# Patient Record
Sex: FEMALE | Race: WHITE | NOT HISPANIC OR LATINO
[De-identification: names, ages, dates, MRNs, and addresses within clinical notes are randomized per-mention and may not be internally consistent; named-entity substitution may affect disease eponyms.]

---

## 2017-09-25 ENCOUNTER — APPOINTMENT (OUTPATIENT)
Dept: MRI IMAGING | Facility: CLINIC | Age: 57
End: 2017-09-25

## 2017-10-22 ENCOUNTER — RESULT REVIEW (OUTPATIENT)
Age: 57
End: 2017-10-22

## 2017-11-22 ENCOUNTER — APPOINTMENT (OUTPATIENT)
Dept: MRI IMAGING | Facility: CLINIC | Age: 57
End: 2017-11-22
Payer: COMMERCIAL

## 2017-11-22 ENCOUNTER — OUTPATIENT (OUTPATIENT)
Dept: OUTPATIENT SERVICES | Facility: HOSPITAL | Age: 57
LOS: 1 days | End: 2017-11-22
Payer: COMMERCIAL

## 2017-11-22 DIAGNOSIS — Z00.8 ENCOUNTER FOR OTHER GENERAL EXAMINATION: ICD-10-CM

## 2017-11-22 PROCEDURE — 72141 MRI NECK SPINE W/O DYE: CPT

## 2017-11-22 PROCEDURE — 72141 MRI NECK SPINE W/O DYE: CPT | Mod: 26

## 2018-03-06 ENCOUNTER — APPOINTMENT (OUTPATIENT)
Dept: OBGYN | Facility: CLINIC | Age: 58
End: 2018-03-06
Payer: COMMERCIAL

## 2018-03-06 VITALS
BODY MASS INDEX: 21.38 KG/M2 | HEIGHT: 66 IN | DIASTOLIC BLOOD PRESSURE: 62 MMHG | SYSTOLIC BLOOD PRESSURE: 126 MMHG | WEIGHT: 133 LBS

## 2018-03-06 PROCEDURE — 99396 PREV VISIT EST AGE 40-64: CPT

## 2018-03-12 LAB — CYTOLOGY CVX/VAG DOC THIN PREP: NORMAL

## 2018-04-10 ENCOUNTER — ASOB RESULT (OUTPATIENT)
Age: 58
End: 2018-04-10

## 2018-04-10 ENCOUNTER — APPOINTMENT (OUTPATIENT)
Dept: OBGYN | Facility: CLINIC | Age: 58
End: 2018-04-10
Payer: COMMERCIAL

## 2018-04-10 PROCEDURE — 76830 TRANSVAGINAL US NON-OB: CPT

## 2018-04-25 DIAGNOSIS — K63.5 POLYP OF COLON: ICD-10-CM

## 2018-04-30 ENCOUNTER — OUTPATIENT (OUTPATIENT)
Dept: OUTPATIENT SERVICES | Facility: HOSPITAL | Age: 58
LOS: 1 days | End: 2018-04-30
Payer: COMMERCIAL

## 2018-04-30 ENCOUNTER — APPOINTMENT (OUTPATIENT)
Dept: SURGERY | Facility: HOSPITAL | Age: 58
End: 2018-04-30
Payer: COMMERCIAL

## 2018-04-30 DIAGNOSIS — K63.5 POLYP OF COLON: ICD-10-CM

## 2018-04-30 PROCEDURE — 45378 DIAGNOSTIC COLONOSCOPY: CPT

## 2018-04-30 PROCEDURE — G0105: CPT

## 2019-05-08 ENCOUNTER — APPOINTMENT (OUTPATIENT)
Dept: OBGYN | Facility: CLINIC | Age: 59
End: 2019-05-08
Payer: COMMERCIAL

## 2019-05-08 VITALS
SYSTOLIC BLOOD PRESSURE: 120 MMHG | DIASTOLIC BLOOD PRESSURE: 70 MMHG | HEIGHT: 66 IN | WEIGHT: 137 LBS | BODY MASS INDEX: 22.02 KG/M2

## 2019-05-08 DIAGNOSIS — Z87.898 PERSONAL HISTORY OF OTHER SPECIFIED CONDITIONS: ICD-10-CM

## 2019-05-08 DIAGNOSIS — B05.9 MEASLES W/OUT COMPLICATION: ICD-10-CM

## 2019-05-08 PROCEDURE — 36415 COLL VENOUS BLD VENIPUNCTURE: CPT

## 2019-05-08 PROCEDURE — 99396 PREV VISIT EST AGE 40-64: CPT

## 2019-05-09 PROBLEM — Z87.898 HISTORY OF VERTIGO: Status: RESOLVED | Noted: 2019-05-09 | Resolved: 2019-05-09

## 2019-05-09 NOTE — PHYSICAL EXAM
[Awake] : awake [Acute Distress] : no acute distress [Alert] : alert [Mass] : no breast mass [Nipple Discharge] : no nipple discharge [Soft] : soft [Axillary LAD] : no axillary lymphadenopathy [Tender] : non tender [Oriented x3] : oriented to person, place, and time [Normal] : uterus [Uterine Adnexae] : were not tender and not enlarged

## 2019-05-10 LAB
MEV IGG FLD QL IA: 36.3 AU/ML
MEV IGG+IGM SER-IMP: POSITIVE

## 2019-05-13 LAB — CYTOLOGY CVX/VAG DOC THIN PREP: NORMAL

## 2019-09-09 ENCOUNTER — APPOINTMENT (OUTPATIENT)
Dept: OBGYN | Facility: CLINIC | Age: 59
End: 2019-09-09
Payer: COMMERCIAL

## 2019-09-09 PROCEDURE — XXXXX: CPT

## 2019-09-11 ENCOUNTER — APPOINTMENT (OUTPATIENT)
Dept: RADIOLOGY | Facility: IMAGING CENTER | Age: 59
End: 2019-09-11

## 2019-10-07 ENCOUNTER — OUTPATIENT (OUTPATIENT)
Dept: OUTPATIENT SERVICES | Facility: HOSPITAL | Age: 59
LOS: 1 days | End: 2019-10-07
Payer: COMMERCIAL

## 2019-10-07 ENCOUNTER — APPOINTMENT (OUTPATIENT)
Dept: RADIOLOGY | Facility: IMAGING CENTER | Age: 59
End: 2019-10-07
Payer: COMMERCIAL

## 2019-10-07 DIAGNOSIS — Z00.8 ENCOUNTER FOR OTHER GENERAL EXAMINATION: ICD-10-CM

## 2019-10-07 PROCEDURE — 77080 DXA BONE DENSITY AXIAL: CPT

## 2019-10-07 PROCEDURE — 77080 DXA BONE DENSITY AXIAL: CPT | Mod: 26

## 2020-10-19 ENCOUNTER — ASOB RESULT (OUTPATIENT)
Age: 60
End: 2020-10-19

## 2020-10-19 ENCOUNTER — APPOINTMENT (OUTPATIENT)
Dept: OBGYN | Facility: CLINIC | Age: 60
End: 2020-10-19
Payer: COMMERCIAL

## 2020-10-19 PROCEDURE — 76830 TRANSVAGINAL US NON-OB: CPT

## 2021-08-09 ENCOUNTER — APPOINTMENT (OUTPATIENT)
Dept: OBGYN | Facility: CLINIC | Age: 61
End: 2021-08-09
Payer: COMMERCIAL

## 2021-08-09 VITALS
DIASTOLIC BLOOD PRESSURE: 80 MMHG | HEIGHT: 66 IN | BODY MASS INDEX: 21.53 KG/M2 | WEIGHT: 134 LBS | SYSTOLIC BLOOD PRESSURE: 120 MMHG

## 2021-08-09 DIAGNOSIS — N83.299 OTHER OVARIAN CYST, UNSPECIFIED SIDE: ICD-10-CM

## 2021-08-09 DIAGNOSIS — N83.202 UNSPECIFIED OVARIAN CYST, LEFT SIDE: ICD-10-CM

## 2021-08-09 DIAGNOSIS — Z01.419 ENCOUNTER FOR GYNECOLOGICAL EXAMINATION (GENERAL) (ROUTINE) W/OUT ABNORMAL FINDINGS: ICD-10-CM

## 2021-08-09 PROCEDURE — 99396 PREV VISIT EST AGE 40-64: CPT

## 2021-08-13 LAB — CYTOLOGY CVX/VAG DOC THIN PREP: NORMAL

## 2022-05-25 DIAGNOSIS — Z86.010 PERSONAL HISTORY OF COLONIC POLYPS: ICD-10-CM

## 2022-07-01 DIAGNOSIS — Z00.00 ENCOUNTER FOR GENERAL ADULT MEDICAL EXAMINATION W/OUT ABNORMAL FINDINGS: ICD-10-CM

## 2022-07-01 DIAGNOSIS — Z12.11 ENCOUNTER FOR SCREENING FOR MALIGNANT NEOPLASM OF COLON: ICD-10-CM

## 2022-07-15 LAB — SARS-COV-2 N GENE NPH QL NAA+PROBE: NOT DETECTED

## 2022-07-19 ENCOUNTER — APPOINTMENT (OUTPATIENT)
Dept: SURGERY | Facility: CLINIC | Age: 62
End: 2022-07-19

## 2022-07-19 PROCEDURE — 45380 COLONOSCOPY AND BIOPSY: CPT

## 2022-07-19 PROCEDURE — 45381 COLONOSCOPY SUBMUCOUS NJX: CPT

## 2022-07-22 LAB — CORE LAB BIOPSY: NORMAL

## 2022-07-29 ENCOUNTER — NON-APPOINTMENT (OUTPATIENT)
Age: 62
End: 2022-07-29

## 2022-07-29 DIAGNOSIS — Z01.812 ENCOUNTER FOR PREPROCEDURAL LABORATORY EXAMINATION: ICD-10-CM

## 2022-10-03 ENCOUNTER — RESULT REVIEW (OUTPATIENT)
Age: 62
End: 2022-10-03

## 2022-10-03 ENCOUNTER — APPOINTMENT (OUTPATIENT)
Dept: GASTROENTEROLOGY | Facility: HOSPITAL | Age: 62
End: 2022-10-03

## 2022-10-03 ENCOUNTER — OUTPATIENT (OUTPATIENT)
Dept: OUTPATIENT SERVICES | Facility: HOSPITAL | Age: 62
LOS: 1 days | Discharge: HOME | End: 2022-10-03

## 2022-10-03 VITALS
WEIGHT: 134.92 LBS | HEIGHT: 65 IN | DIASTOLIC BLOOD PRESSURE: 88 MMHG | TEMPERATURE: 99 F | HEART RATE: 82 BPM | RESPIRATION RATE: 19 BRPM | OXYGEN SATURATION: 97 % | SYSTOLIC BLOOD PRESSURE: 146 MMHG

## 2022-10-03 VITALS
DIASTOLIC BLOOD PRESSURE: 80 MMHG | OXYGEN SATURATION: 100 % | SYSTOLIC BLOOD PRESSURE: 140 MMHG | HEART RATE: 79 BPM | RESPIRATION RATE: 12 BRPM

## 2022-10-03 DIAGNOSIS — K63.5 POLYP OF COLON: ICD-10-CM

## 2022-10-03 LAB — SARS-COV-2 N GENE NPH QL NAA+PROBE: NOT DETECTED

## 2022-10-03 PROCEDURE — 45388 COLONOSCOPY W/ABLATION: CPT | Mod: 59

## 2022-10-03 PROCEDURE — 88305 TISSUE EXAM BY PATHOLOGIST: CPT | Mod: 26

## 2022-10-03 PROCEDURE — 45390 COLONOSCOPY W/RESECTION: CPT

## 2022-10-03 DEVICE — CLIP HEMO INSTINCT PLUS ENDOSCOPIC: Type: IMPLANTABLE DEVICE | Status: FUNCTIONAL

## 2022-10-03 DEVICE — CLIP RESOLUTION 360 235CM: Type: IMPLANTABLE DEVICE | Status: FUNCTIONAL

## 2022-10-10 LAB — SURGICAL PATHOLOGY STUDY: SIGNIFICANT CHANGE UP

## 2022-10-13 ENCOUNTER — APPOINTMENT (OUTPATIENT)
Dept: SURGERY | Facility: CLINIC | Age: 62
End: 2022-10-13

## 2022-10-13 VITALS
HEIGHT: 66 IN | SYSTOLIC BLOOD PRESSURE: 129 MMHG | DIASTOLIC BLOOD PRESSURE: 87 MMHG | OXYGEN SATURATION: 98 % | BODY MASS INDEX: 21.69 KG/M2 | TEMPERATURE: 97.2 F | WEIGHT: 135 LBS | HEART RATE: 96 BPM | RESPIRATION RATE: 17 BRPM

## 2022-10-13 DIAGNOSIS — Z80.42 FAMILY HISTORY OF MALIGNANT NEOPLASM OF PROSTATE: ICD-10-CM

## 2022-10-13 DIAGNOSIS — Z80.0 FAMILY HISTORY OF MALIGNANT NEOPLASM OF DIGESTIVE ORGANS: ICD-10-CM

## 2022-10-13 PROCEDURE — 99215 OFFICE O/P EST HI 40 MIN: CPT

## 2022-10-13 RX ORDER — ASPIRIN 81 MG
81 TABLET,CHEWABLE ORAL
Refills: 0 | Status: DISCONTINUED | COMMUNITY
End: 2022-10-13

## 2022-10-13 RX ORDER — SODIUM PICOSULFATE, MAGNESIUM OXIDE, AND ANHYDROUS CITRIC ACID 10; 3.5; 12 MG/160ML; G/160ML; G/160ML
10-3.5-12 MG-GM LIQUID ORAL
Qty: 1 | Refills: 0 | Status: DISCONTINUED | COMMUNITY
Start: 2022-07-01 | End: 2022-10-13

## 2022-10-13 NOTE — ASSESSMENT
[FreeTextEntry1] : Recurrent lesion in cecum at prior polypectomy site.  This occurred approximately 9 years after polypectomy.  ESD performed and adenocarcinoma less than 1/2 mm from the cauterized margin.  Therefore, resection indicated.  Indications, risks, benefits, alternatives reviewed for laparoscopic right colectomy including but not limited to bleeding, infection, anastomotic leak, conversion to open surgery and change in bowel habits.  CT scans of chest abdomen pelvis prior to surgery.  Patient's  was present for the conversation and all questions were answered.

## 2022-10-13 NOTE — PHYSICAL EXAM
[Normal Breath Sounds] : Normal breath sounds [Normal Heart Sounds] : normal heart sounds [Alert] : alert [Oriented to Person] : oriented to person [Oriented to Place] : oriented to place [Oriented to Time] : oriented to time [Calm] : calm [Abdomen Masses] : No abdominal masses [Abdomen Tenderness] : ~T No ~M abdominal tenderness [de-identified] : WNL [de-identified] : WNL [de-identified] : BASIAL [de-identified] : WNL ROM [de-identified] : WNL

## 2022-10-13 NOTE — HISTORY OF PRESENT ILLNESS
[FreeTextEntry1] : Dione is a 61 y/o female here for a follow up visit, colon cancer. \par \par Colonoscopy on 10/3/22 - one approximately 22mm flat sessile polyp was found in cecum. Endoscopic mucosal resection utilizing a Hybrid EMR/ESD technique was performed. Resection and retrieval were complete. 2 x Hemostatic clips were placed post polypectomy for hemostasis (MR conditional). Pathology - 1. Colon, cecum, polyp: invasive well differentiated adenocarcinoma arising in the tubular adenoma with focal high grade dysplasia. Carcinoma invades the submucosal and is present less than 0.5mm from the nearest deep cauterized tissue edge. Negative for lymphovascular invasion. \par \par Today pt reports no pain. Daily BMs, loose for past 2 days, no straining, no bleeding, no episodes of incontinence, and denies feeling swollen or prolapsed tissue. Denies nausea and vomiting. Denies fever and chills. Good appetite. Takes baby aspirin for prevention.

## 2022-10-17 ENCOUNTER — RESULT REVIEW (OUTPATIENT)
Age: 62
End: 2022-10-17

## 2022-10-17 ENCOUNTER — OUTPATIENT (OUTPATIENT)
Dept: OUTPATIENT SERVICES | Facility: HOSPITAL | Age: 62
LOS: 1 days | End: 2022-10-17
Payer: COMMERCIAL

## 2022-10-17 ENCOUNTER — APPOINTMENT (OUTPATIENT)
Dept: CT IMAGING | Facility: CLINIC | Age: 62
End: 2022-10-17

## 2022-10-17 DIAGNOSIS — Z00.8 ENCOUNTER FOR OTHER GENERAL EXAMINATION: ICD-10-CM

## 2022-10-17 PROCEDURE — 74177 CT ABD & PELVIS W/CONTRAST: CPT

## 2022-10-17 PROCEDURE — 71260 CT THORAX DX C+: CPT

## 2022-10-18 ENCOUNTER — OUTPATIENT (OUTPATIENT)
Dept: OUTPATIENT SERVICES | Facility: HOSPITAL | Age: 62
LOS: 1 days | End: 2022-10-18
Payer: COMMERCIAL

## 2022-10-18 VITALS
DIASTOLIC BLOOD PRESSURE: 70 MMHG | TEMPERATURE: 98 F | SYSTOLIC BLOOD PRESSURE: 110 MMHG | RESPIRATION RATE: 16 BRPM | WEIGHT: 141.1 LBS | HEART RATE: 70 BPM | HEIGHT: 66 IN | OXYGEN SATURATION: 99 %

## 2022-10-18 DIAGNOSIS — Z90.89 ACQUIRED ABSENCE OF OTHER ORGANS: Chronic | ICD-10-CM

## 2022-10-18 DIAGNOSIS — Z01.818 ENCOUNTER FOR OTHER PREPROCEDURAL EXAMINATION: ICD-10-CM

## 2022-10-18 DIAGNOSIS — Z98.891 HISTORY OF UTERINE SCAR FROM PREVIOUS SURGERY: Chronic | ICD-10-CM

## 2022-10-18 DIAGNOSIS — Z29.9 ENCOUNTER FOR PROPHYLACTIC MEASURES, UNSPECIFIED: ICD-10-CM

## 2022-10-18 DIAGNOSIS — Z11.52 ENCOUNTER FOR SCREENING FOR COVID-19: ICD-10-CM

## 2022-10-18 DIAGNOSIS — C18.9 MALIGNANT NEOPLASM OF COLON, UNSPECIFIED: ICD-10-CM

## 2022-10-18 DIAGNOSIS — Z98.890 OTHER SPECIFIED POSTPROCEDURAL STATES: Chronic | ICD-10-CM

## 2022-10-18 LAB
ANION GAP SERPL CALC-SCNC: 13 MMOL/L — SIGNIFICANT CHANGE UP (ref 5–17)
BLD GP AB SCN SERPL QL: NEGATIVE — SIGNIFICANT CHANGE UP
BUN SERPL-MCNC: 21 MG/DL — SIGNIFICANT CHANGE UP (ref 7–23)
CALCIUM SERPL-MCNC: 9.7 MG/DL — SIGNIFICANT CHANGE UP (ref 8.4–10.5)
CHLORIDE SERPL-SCNC: 103 MMOL/L — SIGNIFICANT CHANGE UP (ref 96–108)
CO2 SERPL-SCNC: 24 MMOL/L — SIGNIFICANT CHANGE UP (ref 22–31)
CREAT SERPL-MCNC: 0.83 MG/DL — SIGNIFICANT CHANGE UP (ref 0.5–1.3)
EGFR: 80 ML/MIN/1.73M2 — SIGNIFICANT CHANGE UP
GLUCOSE SERPL-MCNC: 87 MG/DL — SIGNIFICANT CHANGE UP (ref 70–99)
HCT VFR BLD CALC: 38.1 % — SIGNIFICANT CHANGE UP (ref 34.5–45)
HGB BLD-MCNC: 12.7 G/DL — SIGNIFICANT CHANGE UP (ref 11.5–15.5)
MCHC RBC-ENTMCNC: 30.1 PG — SIGNIFICANT CHANGE UP (ref 27–34)
MCHC RBC-ENTMCNC: 33.3 GM/DL — SIGNIFICANT CHANGE UP (ref 32–36)
MCV RBC AUTO: 90.3 FL — SIGNIFICANT CHANGE UP (ref 80–100)
NRBC # BLD: 0 /100 WBCS — SIGNIFICANT CHANGE UP (ref 0–0)
PLATELET # BLD AUTO: 268 K/UL — SIGNIFICANT CHANGE UP (ref 150–400)
POTASSIUM SERPL-MCNC: 3.9 MMOL/L — SIGNIFICANT CHANGE UP (ref 3.5–5.3)
POTASSIUM SERPL-SCNC: 3.9 MMOL/L — SIGNIFICANT CHANGE UP (ref 3.5–5.3)
RBC # BLD: 4.22 M/UL — SIGNIFICANT CHANGE UP (ref 3.8–5.2)
RBC # FLD: 13 % — SIGNIFICANT CHANGE UP (ref 10.3–14.5)
RH IG SCN BLD-IMP: POSITIVE — SIGNIFICANT CHANGE UP
SARS-COV-2 RNA SPEC QL NAA+PROBE: SIGNIFICANT CHANGE UP
SODIUM SERPL-SCNC: 140 MMOL/L — SIGNIFICANT CHANGE UP (ref 135–145)
WBC # BLD: 8.14 K/UL — SIGNIFICANT CHANGE UP (ref 3.8–10.5)
WBC # FLD AUTO: 8.14 K/UL — SIGNIFICANT CHANGE UP (ref 3.8–10.5)

## 2022-10-18 PROCEDURE — 83036 HEMOGLOBIN GLYCOSYLATED A1C: CPT

## 2022-10-18 PROCEDURE — U0005: CPT

## 2022-10-18 PROCEDURE — 86900 BLOOD TYPING SEROLOGIC ABO: CPT

## 2022-10-18 PROCEDURE — 86901 BLOOD TYPING SEROLOGIC RH(D): CPT

## 2022-10-18 PROCEDURE — 85027 COMPLETE CBC AUTOMATED: CPT

## 2022-10-18 PROCEDURE — U0003: CPT

## 2022-10-18 PROCEDURE — C9803: CPT

## 2022-10-18 PROCEDURE — 82378 CARCINOEMBRYONIC ANTIGEN: CPT

## 2022-10-18 PROCEDURE — G0463: CPT

## 2022-10-18 PROCEDURE — 80048 BASIC METABOLIC PNL TOTAL CA: CPT

## 2022-10-18 PROCEDURE — 36415 COLL VENOUS BLD VENIPUNCTURE: CPT

## 2022-10-18 PROCEDURE — 86850 RBC ANTIBODY SCREEN: CPT

## 2022-10-18 RX ORDER — CEFOTETAN DISODIUM 1 G
2 VIAL (EA) INJECTION ONCE
Refills: 0 | Status: DISCONTINUED | OUTPATIENT
Start: 2022-10-21 | End: 2022-10-22

## 2022-10-18 RX ORDER — SODIUM CHLORIDE 9 MG/ML
3 INJECTION INTRAMUSCULAR; INTRAVENOUS; SUBCUTANEOUS EVERY 8 HOURS
Refills: 0 | Status: DISCONTINUED | OUTPATIENT
Start: 2022-10-21 | End: 2022-10-21

## 2022-10-18 RX ORDER — CHLORHEXIDINE GLUCONATE 213 G/1000ML
1 SOLUTION TOPICAL ONCE
Refills: 0 | Status: DISCONTINUED | OUTPATIENT
Start: 2022-10-21 | End: 2022-10-21

## 2022-10-18 RX ORDER — LIDOCAINE HCL 20 MG/ML
0.2 VIAL (ML) INJECTION ONCE
Refills: 0 | Status: DISCONTINUED | OUTPATIENT
Start: 2022-10-21 | End: 2022-10-21

## 2022-10-18 RX ORDER — GABAPENTIN 400 MG/1
600 CAPSULE ORAL ONCE
Refills: 0 | Status: COMPLETED | OUTPATIENT
Start: 2022-10-21 | End: 2022-10-21

## 2022-10-18 RX ORDER — CELECOXIB 200 MG/1
400 CAPSULE ORAL ONCE
Refills: 0 | Status: COMPLETED | OUTPATIENT
Start: 2022-10-21 | End: 2022-10-21

## 2022-10-18 NOTE — H&P PST ADULT - ASSESSMENT
TELLOI VTE 2.0 SCORE [CLOT updated 2019]    AGE RELATED RISK FACTORS                                                       MOBILITY RELATED FACTORS  [ ] Age 41-60 years                                            (1 Point)                    [ ] Bed rest                                                        (1 Point)  [ ] Age: 61-74 years                                           (2 Points)                  [ ] Plaster cast                                                   (2 Points)  [ ] Age= 75 years                                              (3 Points)                    [ ] Bed bound for more than 72 hours                 (2 Points)    DISEASE RELATED RISK FACTORS                                               GENDER SPECIFIC FACTORS  [ ] Edema in the lower extremities                       (1 Point)              [ ] Pregnancy                                                     (1 Point)  [ ] Varicose veins                                               (1 Point)                     [ ] Post-partum < 6 weeks                                   (1 Point)             [ ] BMI > 25 Kg/m2                                            (1 Point)                     [ ] Hormonal therapy  or oral contraception          (1 Point)                 [ ] Sepsis (in the previous month)                        (1 Point)               [ ] History of pregnancy complications                 (1 point)  [ ] Pneumonia or serious lung disease                                               [ ] Unexplained or recurrent                     (1 Point)           (in the previous month)                               (1 Point)  [ ] Abnormal pulmonary function test                     (1 Point)                 SURGERY RELATED RISK FACTORS  [ ] Acute myocardial infarction                              (1 Point)               [ ]  Section                                             (1 Point)  [ ] Congestive heart failure (in the previous month)  (1 Point)      [ ] Minor surgery                                                  (1 Point)   [ ] Inflammatory bowel disease                             (1 Point)               [ ] Arthroscopic surgery                                        (2 Points)  [ ] Central venous access                                      (2 Points)                [ ] General surgery lasting more than 45 minutes (2 points)  [ ] Malignancy- Present or previous                   (2 Points)                [ ] Elective arthroplasty                                         (5 points)    [ ] Stroke (in the previous month)                          (5 Points)                                                                                                                                                           HEMATOLOGY RELATED FACTORS                                                 TRAUMA RELATED RISK FACTORS  [ ] Prior episodes of VTE                                     (3 Points)                [ ] Fracture of the hip, pelvis, or leg                       (5 Points)  [ ] Positive family history for VTE                         (3 Points)             [ ] Acute spinal cord injury (in the previous month)  (5 Points)  [ ] Prothrombin 89868 A                                     (3 Points)               [ ] Paralysis  (less than 1 month)                             (5 Points)  [ ] Factor V Leiden                                             (3 Points)                  [ ] Multiple Trauma within 1 month                        (5 Points)  [ ] Lupus anticoagulants                                     (3 Points)                                                           [ ] Anticardiolipin antibodies                               (3 Points)                                                       [ ] High homocysteine in the blood                      (3 Points)                                             [ ] Other congenital or acquired thrombophilia      (3 Points)                                                [ ] Heparin induced thrombocytopenia                  (3 Points)                                     Total Score [          ] TELLOI VTE 2.0 SCORE [CLOT updated 2019]    AGE RELATED RISK FACTORS                                                       MOBILITY RELATED FACTORS  [ ] Age 41-60 years                                            (1 Point)                    [ ] Bed rest                                                        (1 Point)  [x ] Age: 61-74 years                                           (2 Points)                  [ ] Plaster cast                                                   (2 Points)  [ ] Age= 75 years                                              (3 Points)                    [ ] Bed bound for more than 72 hours                 (2 Points)    DISEASE RELATED RISK FACTORS                                               GENDER SPECIFIC FACTORS  [ ] Edema in the lower extremities                       (1 Point)              [ ] Pregnancy                                                     (1 Point)  [ ] Varicose veins                                               (1 Point)                     [ ] Post-partum < 6 weeks                                   (1 Point)             [ ] BMI > 25 Kg/m2                                            (1 Point)                     [ ] Hormonal therapy  or oral contraception          (1 Point)                 [ ] Sepsis (in the previous month)                        (1 Point)               [ ] History of pregnancy complications                 (1 point)  [ ] Pneumonia or serious lung disease                                               [ ] Unexplained or recurrent                     (1 Point)           (in the previous month)                               (1 Point)  [ ] Abnormal pulmonary function test                     (1 Point)                 SURGERY RELATED RISK FACTORS  [ ] Acute myocardial infarction                              (1 Point)               [ ]  Section                                             (1 Point)  [ ] Congestive heart failure (in the previous month)  (1 Point)      [ ] Minor surgery                                                  (1 Point)   [ ] Inflammatory bowel disease                             (1 Point)               [ ] Arthroscopic surgery                                        (2 Points)  [ ] Central venous access                                      (2 Points)                [x ] General surgery lasting more than 45 minutes (2 points)  [ x] Malignancy- Present or previous                   (2 Points)                [ ] Elective arthroplasty                                         (5 points)    [ ] Stroke (in the previous month)                          (5 Points)                                                                                                                                                           HEMATOLOGY RELATED FACTORS                                                 TRAUMA RELATED RISK FACTORS  [ ] Prior episodes of VTE                                     (3 Points)                [ ] Fracture of the hip, pelvis, or leg                       (5 Points)  [ ] Positive family history for VTE                         (3 Points)             [ ] Acute spinal cord injury (in the previous month)  (5 Points)  [ ] Prothrombin 46033 A                                     (3 Points)               [ ] Paralysis  (less than 1 month)                             (5 Points)  [ ] Factor V Leiden                                             (3 Points)                  [ ] Multiple Trauma within 1 month                        (5 Points)  [ ] Lupus anticoagulants                                     (3 Points)                                                           [ ] Anticardiolipin antibodies                               (3 Points)                                                       [ ] High homocysteine in the blood                      (3 Points)                                             [ ] Other congenital or acquired thrombophilia      (3 Points)                                                [ ] Heparin induced thrombocytopenia                  (3 Points)                                     Total Score [    6      ]

## 2022-10-18 NOTE — H&P PST ADULT - PROBLEM SELECTOR PLAN 1
planned for ERP, laparoscopic right colectomy, possible open on 10/21/22   PST labs send  preprocedure surgical scrub incentive spirometry instructions discussed

## 2022-10-18 NOTE — H&P PST ADULT - HISTORY OF PRESENT ILLNESS
covid test 10/18 at [facility]  Denies any recent covid infection or exposure  62 year old female with PMH of HLD with colon adenocarcinoma planned for ERP, laparoscopic right colectomy, possible open on 10/21/22     covid test 10/18 at [facility]   Denies any recent covid infection or exposure

## 2022-10-18 NOTE — H&P PST ADULT - NSICDXPASTMEDICALHX_GEN_ALL_CORE_FT
PAST MEDICAL HISTORY:  2019 novel coronavirus disease (COVID-19) 2/2022 mild    Anxiety     Colon adenocarcinoma     MVP (mitral valve prolapse)      PAST MEDICAL HISTORY:  2019 novel coronavirus disease (COVID-19) 2/2022 mild, no hospital stay    Anxiety     Colon adenocarcinoma     MVP (mitral valve prolapse)

## 2022-10-18 NOTE — H&P PST ADULT - FALL HARM RISK - UNIVERSAL INTERVENTIONS
Bed in lowest position, wheels locked, appropriate side rails in place/Call bell, personal items and telephone in reach/Instruct patient to call for assistance before getting out of bed or chair/Non-slip footwear when patient is out of bed/[place] to call system/Physically safe environment - no spills, clutter or unnecessary equipment/Purposeful Proactive Rounding/Room/bathroom lighting operational, light cord in reach

## 2022-10-18 NOTE — H&P PST ADULT - RESPIRATORY
clear to auscultation bilaterally/no wheezes/airway patent/respirations non-labored/no intercostal retractions

## 2022-10-19 LAB
A1C WITH ESTIMATED AVERAGE GLUCOSE RESULT: 5.2 % — SIGNIFICANT CHANGE UP (ref 4–5.6)
CEA SERPL-MCNC: 1.7 NG/ML — SIGNIFICANT CHANGE UP (ref 0–3.8)
ESTIMATED AVERAGE GLUCOSE: 103 MG/DL — SIGNIFICANT CHANGE UP (ref 68–114)

## 2022-10-20 ENCOUNTER — TRANSCRIPTION ENCOUNTER (OUTPATIENT)
Age: 62
End: 2022-10-20

## 2022-10-21 ENCOUNTER — APPOINTMENT (OUTPATIENT)
Dept: SURGERY | Facility: HOSPITAL | Age: 62
End: 2022-10-21

## 2022-10-21 ENCOUNTER — RESULT REVIEW (OUTPATIENT)
Age: 62
End: 2022-10-21

## 2022-10-21 ENCOUNTER — INPATIENT (INPATIENT)
Facility: HOSPITAL | Age: 62
LOS: 0 days | Discharge: HOME | DRG: 331 | End: 2022-10-22
Payer: COMMERCIAL

## 2022-10-21 VITALS
OXYGEN SATURATION: 98 % | DIASTOLIC BLOOD PRESSURE: 84 MMHG | SYSTOLIC BLOOD PRESSURE: 134 MMHG | HEART RATE: 100 BPM | HEIGHT: 66 IN | TEMPERATURE: 98 F | RESPIRATION RATE: 18 BRPM | WEIGHT: 141.1 LBS

## 2022-10-21 DIAGNOSIS — Z98.890 OTHER SPECIFIED POSTPROCEDURAL STATES: Chronic | ICD-10-CM

## 2022-10-21 DIAGNOSIS — Z98.891 HISTORY OF UTERINE SCAR FROM PREVIOUS SURGERY: Chronic | ICD-10-CM

## 2022-10-21 DIAGNOSIS — C18.9 MALIGNANT NEOPLASM OF COLON, UNSPECIFIED: ICD-10-CM

## 2022-10-21 DIAGNOSIS — Z90.89 ACQUIRED ABSENCE OF OTHER ORGANS: Chronic | ICD-10-CM

## 2022-10-21 LAB
GLUCOSE BLDC GLUCOMTR-MCNC: 223 MG/DL — HIGH (ref 70–99)
RH IG SCN BLD-IMP: POSITIVE — SIGNIFICANT CHANGE UP

## 2022-10-21 PROCEDURE — 88309 TISSUE EXAM BY PATHOLOGIST: CPT | Mod: 26

## 2022-10-21 PROCEDURE — 44204 LAPARO PARTIAL COLECTOMY: CPT

## 2022-10-21 PROCEDURE — 88342 IMHCHEM/IMCYTCHM 1ST ANTB: CPT | Mod: 26

## 2022-10-21 PROCEDURE — 88313 SPECIAL STAINS GROUP 2: CPT | Mod: 26

## 2022-10-21 PROCEDURE — 88341 IMHCHEM/IMCYTCHM EA ADD ANTB: CPT | Mod: 26

## 2022-10-21 DEVICE — STAPLER COVIDIEN GIA 80-3.0MM PURPLE: Type: IMPLANTABLE DEVICE | Status: FUNCTIONAL

## 2022-10-21 DEVICE — STAPLER COVIDIEN GIA 80-3.0MM PURPLE RELOAD: Type: IMPLANTABLE DEVICE | Status: FUNCTIONAL

## 2022-10-21 RX ORDER — OXYCODONE HYDROCHLORIDE 5 MG/1
5 TABLET ORAL EVERY 4 HOURS
Refills: 0 | Status: DISCONTINUED | OUTPATIENT
Start: 2022-10-21 | End: 2022-10-22

## 2022-10-21 RX ORDER — DIPHENHYDRAMINE HCL 50 MG
25 CAPSULE ORAL EVERY 4 HOURS
Refills: 0 | Status: DISCONTINUED | OUTPATIENT
Start: 2022-10-21 | End: 2022-10-22

## 2022-10-21 RX ORDER — ACETAMINOPHEN 500 MG
1000 TABLET ORAL EVERY 6 HOURS
Refills: 0 | Status: DISCONTINUED | OUTPATIENT
Start: 2022-10-21 | End: 2022-10-22

## 2022-10-21 RX ORDER — KETOROLAC TROMETHAMINE 30 MG/ML
15 SYRINGE (ML) INJECTION EVERY 6 HOURS
Refills: 0 | Status: DISCONTINUED | OUTPATIENT
Start: 2022-10-21 | End: 2022-10-22

## 2022-10-21 RX ORDER — ATORVASTATIN CALCIUM 80 MG/1
10 TABLET, FILM COATED ORAL AT BEDTIME
Refills: 0 | Status: DISCONTINUED | OUTPATIENT
Start: 2022-10-21 | End: 2022-10-22

## 2022-10-21 RX ORDER — OXYCODONE HYDROCHLORIDE 5 MG/1
2.5 TABLET ORAL EVERY 4 HOURS
Refills: 0 | Status: DISCONTINUED | OUTPATIENT
Start: 2022-10-21 | End: 2022-10-22

## 2022-10-21 RX ORDER — SODIUM CHLORIDE 9 MG/ML
1000 INJECTION, SOLUTION INTRAVENOUS
Refills: 0 | Status: DISCONTINUED | OUTPATIENT
Start: 2022-10-21 | End: 2022-10-22

## 2022-10-21 RX ORDER — NALOXONE HYDROCHLORIDE 4 MG/.1ML
0.1 SPRAY NASAL
Refills: 0 | Status: DISCONTINUED | OUTPATIENT
Start: 2022-10-21 | End: 2022-10-22

## 2022-10-21 RX ORDER — HEPARIN SODIUM 5000 [USP'U]/ML
5000 INJECTION INTRAVENOUS; SUBCUTANEOUS EVERY 8 HOURS
Refills: 0 | Status: DISCONTINUED | OUTPATIENT
Start: 2022-10-21 | End: 2022-10-22

## 2022-10-21 RX ORDER — ONDANSETRON 8 MG/1
4 TABLET, FILM COATED ORAL EVERY 6 HOURS
Refills: 0 | Status: DISCONTINUED | OUTPATIENT
Start: 2022-10-21 | End: 2022-10-22

## 2022-10-21 RX ORDER — MORPHINE SULFATE 50 MG/1
0.1 CAPSULE, EXTENDED RELEASE ORAL ONCE
Refills: 0 | Status: DISCONTINUED | OUTPATIENT
Start: 2022-10-21 | End: 2022-10-22

## 2022-10-21 RX ADMIN — SODIUM CHLORIDE 40 MILLILITER(S): 9 INJECTION, SOLUTION INTRAVENOUS at 17:39

## 2022-10-21 RX ADMIN — Medication 400 MILLIGRAM(S): at 22:00

## 2022-10-21 RX ADMIN — GABAPENTIN 600 MILLIGRAM(S): 400 CAPSULE ORAL at 12:10

## 2022-10-21 RX ADMIN — CELECOXIB 400 MILLIGRAM(S): 200 CAPSULE ORAL at 12:09

## 2022-10-21 RX ADMIN — Medication 15 MILLIGRAM(S): at 22:30

## 2022-10-21 RX ADMIN — Medication 15 MILLIGRAM(S): at 22:01

## 2022-10-21 RX ADMIN — HEPARIN SODIUM 5000 UNIT(S): 5000 INJECTION INTRAVENOUS; SUBCUTANEOUS at 22:00

## 2022-10-21 RX ADMIN — Medication 1000 MILLIGRAM(S): at 22:30

## 2022-10-21 NOTE — PRE-OP CHECKLIST - ASSESSMENT, HISTORY & PHYSICAL COMPLETED AND ON MEDICAL RECORD
done Protopic Counseling: Patient may experience a mild burning sensation during topical application. Protopic is not approved in children less than 2 years of age. There have been case reports of hematologic and skin malignancies in patients using topical calcineurin inhibitors although causality is questionable.

## 2022-10-21 NOTE — BRIEF OPERATIVE NOTE - OPERATION/FINDINGS
Single port umbilical incision right colon mobilized and ileocolic pedicle dissected and divided with Ligasure. Ileum and Transverse Colon divided with stapler and reanastomosed with stapler and oversewed with silks. Fascia closed with Maxon and Penrose placed in incision and skin closed with monocryl.

## 2022-10-21 NOTE — CHART NOTE - NSCHARTNOTEFT_GEN_A_CORE
Surgery Post-Op Note    Pre-Op Dx: Cecal polyp    Procedure: laparoscopic right hemicolectomy        Surgeon: Dorothy    SUBJECTIVE:  Pt seen and examined at the bedside. Pt denies pain/N/V. Is tolerating CLD, +flatus, +gas.    OBJECTIVE:  Vital Signs Last 24 Hrs  T(C): 36.7 (21 Oct 2022 21:05), Max: 36.8 (21 Oct 2022 10:44)  T(F): 98 (21 Oct 2022 21:05), Max: 98.2 (21 Oct 2022 11:50)  HR: 91 (21 Oct 2022 21:05) (89 - 100)  BP: 120/76 (21 Oct 2022 21:05) (115/61 - 134/84)  BP(mean): 92 (21 Oct 2022 19:45) (81 - 92)  RR: 18 (21 Oct 2022 21:05) (15 - 18)  SpO2: 97% (21 Oct 2022 21:05) (96% - 99%)    Parameters below as of 21 Oct 2022 21:05  Patient On (Oxygen Delivery Method): nasal cannula  O2 Flow (L/min): 2      Physical Exam:  General: NAD, resting comfortably in bed  Neuro: A/O x 3, no focal deficits  Pulmonary: Nonlabored breathing, no respiratory distress  Abdominal: soft, NTND, umbilical incision dressing c moderate ss drainage.  Extremities: WWP    LABS:            CAPILLARY BLOOD GLUCOSE      POCT Blood Glucose.: 223 mg/dL (21 Oct 2022 10:42)        ABO Interpretation: A (10-21 @ 11:49)        ASSESSMENT:62y Female now 4hours s/p     PLAN:  - Pain control  - Encourage IS  - Monitor vitals  - Monitor I+Os- walt clemons 1700  - OOB/ Ambulate      Team Surgery  p0004 Surgery Post-Op Note    Pre-Op Dx: Cecal polyp    Procedure: laparoscopic right hemicolectomy        Surgeon: Dorothy    SUBJECTIVE:  Pt seen and examined at the bedside. Pt denies pain/N/V. Is tolerating CLD, +flatus, +gas.    OBJECTIVE:  Vital Signs Last 24 Hrs  T(C): 36.7 (21 Oct 2022 21:05), Max: 36.8 (21 Oct 2022 10:44)  T(F): 98 (21 Oct 2022 21:05), Max: 98.2 (21 Oct 2022 11:50)  HR: 91 (21 Oct 2022 21:05) (89 - 100)  BP: 120/76 (21 Oct 2022 21:05) (115/61 - 134/84)  BP(mean): 92 (21 Oct 2022 19:45) (81 - 92)  RR: 18 (21 Oct 2022 21:05) (15 - 18)  SpO2: 97% (21 Oct 2022 21:05) (96% - 99%)    Parameters below as of 21 Oct 2022 21:05  Patient On (Oxygen Delivery Method): nasal cannula  O2 Flow (L/min): 2      Physical Exam:  General: NAD, resting comfortably in bed  Neuro: A/O x 3, no focal deficits  Pulmonary: Nonlabored breathing, no respiratory distress  Abdominal: soft, NTND, umbilical incision dressing c moderate ss drainage.  Extremities: WWP    LABS:            CAPILLARY BLOOD GLUCOSE      POCT Blood Glucose.: 223 mg/dL (21 Oct 2022 10:42)        ABO Interpretation: A (10-21 @ 11:49)        ASSESSMENT:62y Female now 4hours s/p laparoscopic right hemicolectomy    PLAN:  - Pain control  - Encourage IS  - Monitor vitals  - Monitor I+Os  - OOB/ Ambulate   - DVT ppx: heparin q8h    Green   8849

## 2022-10-21 NOTE — PACU DISCHARGE NOTE - AIRWAY PATENCY:
Informed patient of Ct Abd results, \"nothing serious\" per Dr. Bernard Grant.  Patient has a follow up appt 10-1-2020
Satisfactory

## 2022-10-22 ENCOUNTER — TRANSCRIPTION ENCOUNTER (OUTPATIENT)
Age: 62
End: 2022-10-22

## 2022-10-22 VITALS
DIASTOLIC BLOOD PRESSURE: 69 MMHG | OXYGEN SATURATION: 96 % | SYSTOLIC BLOOD PRESSURE: 111 MMHG | RESPIRATION RATE: 18 BRPM | HEART RATE: 78 BPM | TEMPERATURE: 98 F

## 2022-10-22 LAB
ANION GAP SERPL CALC-SCNC: 12 MMOL/L — SIGNIFICANT CHANGE UP (ref 5–17)
BUN SERPL-MCNC: 14 MG/DL — SIGNIFICANT CHANGE UP (ref 7–23)
CALCIUM SERPL-MCNC: 9.2 MG/DL — SIGNIFICANT CHANGE UP (ref 8.4–10.5)
CHLORIDE SERPL-SCNC: 100 MMOL/L — SIGNIFICANT CHANGE UP (ref 96–108)
CO2 SERPL-SCNC: 24 MMOL/L — SIGNIFICANT CHANGE UP (ref 22–31)
CREAT SERPL-MCNC: 0.76 MG/DL — SIGNIFICANT CHANGE UP (ref 0.5–1.3)
EGFR: 89 ML/MIN/1.73M2 — SIGNIFICANT CHANGE UP
GLUCOSE SERPL-MCNC: 109 MG/DL — HIGH (ref 70–99)
HCT VFR BLD CALC: 34 % — LOW (ref 34.5–45)
HGB BLD-MCNC: 11.7 G/DL — SIGNIFICANT CHANGE UP (ref 11.5–15.5)
MAGNESIUM SERPL-MCNC: 2 MG/DL — SIGNIFICANT CHANGE UP (ref 1.6–2.6)
MCHC RBC-ENTMCNC: 30.1 PG — SIGNIFICANT CHANGE UP (ref 27–34)
MCHC RBC-ENTMCNC: 34.4 GM/DL — SIGNIFICANT CHANGE UP (ref 32–36)
MCV RBC AUTO: 87.4 FL — SIGNIFICANT CHANGE UP (ref 80–100)
NRBC # BLD: 0 /100 WBCS — SIGNIFICANT CHANGE UP (ref 0–0)
PHOSPHATE SERPL-MCNC: 4.3 MG/DL — SIGNIFICANT CHANGE UP (ref 2.5–4.5)
PLATELET # BLD AUTO: 223 K/UL — SIGNIFICANT CHANGE UP (ref 150–400)
POTASSIUM SERPL-MCNC: 4.2 MMOL/L — SIGNIFICANT CHANGE UP (ref 3.5–5.3)
POTASSIUM SERPL-SCNC: 4.2 MMOL/L — SIGNIFICANT CHANGE UP (ref 3.5–5.3)
RBC # BLD: 3.89 M/UL — SIGNIFICANT CHANGE UP (ref 3.8–5.2)
RBC # FLD: 12.9 % — SIGNIFICANT CHANGE UP (ref 10.3–14.5)
SODIUM SERPL-SCNC: 136 MMOL/L — SIGNIFICANT CHANGE UP (ref 135–145)
WBC # BLD: 12.77 K/UL — HIGH (ref 3.8–10.5)
WBC # FLD AUTO: 12.77 K/UL — HIGH (ref 3.8–10.5)

## 2022-10-22 RX ORDER — MAGNESIUM OXIDE 400 MG ORAL TABLET 241.3 MG
1000 TABLET ORAL
Refills: 0 | Status: DISCONTINUED | OUTPATIENT
Start: 2022-10-22 | End: 2022-10-22

## 2022-10-22 RX ORDER — ACETAMINOPHEN 500 MG
975 TABLET ORAL EVERY 6 HOURS
Refills: 0 | Status: DISCONTINUED | OUTPATIENT
Start: 2022-10-22 | End: 2022-10-22

## 2022-10-22 RX ORDER — OXYCODONE HYDROCHLORIDE 5 MG/1
1 TABLET ORAL
Qty: 8 | Refills: 0
Start: 2022-10-22 | End: 2022-10-23

## 2022-10-22 RX ORDER — IBUPROFEN 200 MG
600 TABLET ORAL EVERY 6 HOURS
Refills: 0 | Status: DISCONTINUED | OUTPATIENT
Start: 2022-10-22 | End: 2022-10-22

## 2022-10-22 RX ADMIN — HEPARIN SODIUM 5000 UNIT(S): 5000 INJECTION INTRAVENOUS; SUBCUTANEOUS at 14:52

## 2022-10-22 RX ADMIN — Medication 975 MILLIGRAM(S): at 13:27

## 2022-10-22 RX ADMIN — MAGNESIUM OXIDE 400 MG ORAL TABLET 1000 MILLIGRAM(S): 241.3 TABLET ORAL at 17:49

## 2022-10-22 RX ADMIN — Medication 400 MILLIGRAM(S): at 05:21

## 2022-10-22 RX ADMIN — Medication 600 MILLIGRAM(S): at 14:40

## 2022-10-22 RX ADMIN — Medication 15 MILLIGRAM(S): at 05:22

## 2022-10-22 RX ADMIN — Medication 15 MILLIGRAM(S): at 05:51

## 2022-10-22 RX ADMIN — HEPARIN SODIUM 5000 UNIT(S): 5000 INJECTION INTRAVENOUS; SUBCUTANEOUS at 05:22

## 2022-10-22 RX ADMIN — Medication 975 MILLIGRAM(S): at 17:49

## 2022-10-22 RX ADMIN — Medication 1000 MILLIGRAM(S): at 05:51

## 2022-10-22 NOTE — DIETITIAN INITIAL EVALUATION ADULT - PHYSCIAL ASSESSMENT
Weight Hx Per:  - Source: Pt   - UBW: 140 pounds   - Reported weight changes: none    [facility]    Current Ad[facility] Weights:  - Dosing weight: 141.1 pounds (10/21)    Weight Change:  - n/a    **  Will continue to monitor weight trends as available/able.     IBW: 130 pounds   %IBW: 108%

## 2022-10-22 NOTE — PROGRESS NOTE ADULT - ASSESSMENT
ASSESSMENT:62y Female s/p laparoscopic right hemicolectomy for cecal polyp.    PLAN:  - Pain control  - Encourage IS  - Monitor vitals  - Monitor I+Os  - OOB/ Ambulate   - DVT ppx: heparin q8h    Green   6860. ASSESSMENT:62y Female s/p laparoscopic right hemicolectomy for cecal polyp.    PLAN:  - Pain control  - LRD / IV lock  - d/c [place]  - Encourage IS  - Monitor vitals  - Monitor I+Os  - OOB/ Ambulate   - DVT ppx: heparin q8h  - PT f/u  - restart home atorvastatin      Green   7810.

## 2022-10-22 NOTE — PHYSICAL THERAPY INITIAL EVALUATION ADULT - MD ORDER
Visit Information Date & Time Provider Department Dept. Phone Encounter #  
 12/4/2017  1:30 PM Darya Baca [facility] [phone] [id number] Follow-up Instructions Return in about 1 year (around 12/4/2018). Follow-up and Disposition History Your Appointments 12/11/2017  1:00 PM  
FOLLOW UP 10 with MD KEYSHA Baca [facility] ([address]) Appt Note: 2 week follow up   PT  
 [address] [phone] [address] [address]  
  
    
 3/1/2018  8:40 AM  
FOLLOW UP 10 with MD KEYSHA Baca [facility] ([address]) Appt Note: follow up [address] [id number] [phone] Upcoming Health Maintenance Date Due  
 EYE EXAM RETINAL OR DILATED Q1 11/4/1944 DTaP/Tdap/Td series (1 - Tdap) 11/4/1955 ZOSTER VACCINE AGE 60> 9/4/1994 GLAUCOMA SCREENING Q2Y 11/4/1999 HEMOGLOBIN A1C Q6M 5/13/2018 FOOT EXAM Q1 8/10/2018 MEDICARE YEARLY EXAM 8/11/2018 MICROALBUMIN Q1 11/13/2018 LIPID PANEL Q1 11/13/2018 Allergies as of 12/4/2017  Review Complete On: 12/4/2017 By: Macario Whitmore MD  
 No Known Allergies Current Immunizations  Reviewed on 12/17/2014 Name Date Influenza High Dose Vaccine PF 9/29/2017 Influenza Vaccine 10/25/2016, 10/28/2015, 11/1/2014 Pneumococcal Conjugate (PCV-13) 10/30/2015 Pneumococcal Polysaccharide (PPSV-23) 10/7/2011 Pneumococcal Vaccine (Unspecified Type) 11/1/2011 Not reviewed this visit You Were Diagnosed With   
  
 Codes Comments Paroxysmal atrial fibrillation (HCC)    -  Primary ICD-10-CM: I48.0 ICD-9-CM: 427.31 Anemia, unspecified type     ICD-10-CM: D64.9 ICD-9-CM: 285.9 CKD (chronic kidney disease) stage 3, GFR 30-59 ml/min     ICD-10-CM: N18.3 ICD-9-CM: 585.3 Ischemia of left lower extremity     ICD-10-CM: I99.8 ICD-9-CM: 459.9 Vitals BP Pulse Temp Resp Height(growth percentile) Weight(growth percentile) 137/80 (BP 1 Location: Left arm, BP Patient Position: Sitting) 64 97.8 °F (36.6 °C) (Oral) 12 5' 7\" (1.702 m) 181 lb 12.8 oz (82.5 kg) SpO2 BMI Smoking Status 96% 28.47 kg/m2 Former Smoker Vitals History BMI and BSA Data Body Mass Index Body Surface Area  
 28.47 kg/m 2 1.97 m 2 Preferred Pharmacy Pharmacy Name Phone [phone] [phone] Your Updated Medication List  
  
   
This list is accurate as of: 12/4/17  2:33 PM.  Always use your most recent med list.  
  
  
  
  
 ascorbic acid (vitamin C) 250 mg tablet Commonly known as:  VITAMIN C  
TAKE 1 TABLET BY MOUTH 3 TIMES A DAY  
  
 atorvastatin 40 mg tablet Commonly known as:  LIPITOR  
TAKE 1 TABLET BY MOUTH EVERY DAY  
  
 carvedilol 3.125 mg tablet Commonly known as:  COREG  
TAKE 1 TABLET BY MOUTH TWICE A DAY  
  
 clopidogrel 75 mg Tab Commonly known as:  PLAVIX Take 1 Tab by mouth daily. FABB 2.2-25-1 mg Tab Generic drug:  folic acid-vit W0-CLF D50 TAKE 1 TABLET BY MOUTH DAILY  
  
 furosemide 40 mg tablet Commonly known as:  LASIX TAKE 1 TABLET BY MOUTH DAILY Iron 325 mg (65 mg iron) tablet Generic drug:  ferrous sulfate Take  by mouth three (3) times daily (with meals). levothyroxine 125 mcg tablet Commonly known as:  SYNTHROID Take 125 mcg by mouth Daily (before breakfast). losartan 100 mg tablet Commonly known as:  COZAAR  
TAKE 1 TABLET BY MOUTH EVERY DAY  
  
 omeprazole 20 mg capsule Commonly known as:  PriLOSEC Take 1 capsule by mouth daily. warfarin 2.5 mg tablet Commonly known as:  COUMADIN Current dose is 2.5 mg on Monday and Thursday and 5 mg (2 tabs) all other days.  Dosing subject to change based on lab results. We Performed the Following AMB POC BASIC METABOLIC PANEL [86296 CPT(R)] AMB POC COMPLETE CBC,AUTOMATED ENTER [id number] CPT(R)] AMB POC PT/INR [75269 CPT(R)] Follow-up Instructions Return in about 1 year (around 12/4/2018). Introducing [facility]! Francesca Benedict introduces [employer] patient portal. Now you can access parts of your medical record, email your doctor's office, and request medication refills online. 1. In your internet browser, go to [email or web address] 2. Click on the First Time User? Click Here link in the Sign In box. You will see the New Member Sign Up page. 3. Enter your [employer] Access Code exactly as it appears below. You will not need to use this code after youve completed the sign-up process. If you do not sign up before the expiration date, you must request a new code. · [employer] Access Code: [email or web address] Expires: 12/31/2017 12:42 PM 
 
4. Enter the last four digits of your Social Security Number (xxxx) and Date of Birth (mm/dd/yyyy) as indicated and click Submit. You will be taken to the next sign-up page. 5. Create a [employer] ID. This will be your [employer] login ID and cannot be changed, so think of one that is secure and easy to remember. 6. Create a [employer] password. You can change your password at any time. 7. Enter your Password Reset Question and Answer. This can be used at a later time if you forget your password. 8. Enter your e-mail address. You will receive e-mail notification when new information is available in [address] 9. Click Sign Up. You can now view and download portions of your medical record. 10. Click the Download Summary menu link to download a portable copy of your medical information. If you have questions, please visit the Frequently Asked Questions section of the [employer] website.  Remember, [employer] is NOT to be used for urgent needs. For medical emergencies, dial 911. Now available from your iPhone and Android! Please provide this summary of care documentation to your next provider. Your primary care clinician is listed as Anibal. If you have any questions after today's visit, please call [phone]. amb as abbie.

## 2022-10-22 NOTE — DISCHARGE NOTE PROVIDER - NSDCCPCAREPLAN_GEN_ALL_CORE_FT
PRINCIPAL DISCHARGE DIAGNOSIS  Diagnosis: Colon adenocarcinoma  Assessment and Plan of Treatment: WOUND CARE:  Please keep incisions clean and dry. Please do not Scrub or rub incisions. Do not use lotion or powder on incisions.   BATHING: You may shower and/or sponge bathe. You may use warm soapy water in the shower and rinse, pat dry.  ACTIVITY: No heavy lifting or straining. Otherwise, you may return to your usual level of physical activity. If you are taking narcotic pain medication DO NOT drive a car, operate machinery or make important decisions.  DIET: Return to your usual diet.  NOTIFY YOUR SURGEON IF YOU HAVE: any bleeding that does not stop, any pus draining from your wound(s), any fever (over 100.4 F) persistent nausea/vomiting, or if your pain is not controlled on your discharge pain medications, unable to urinate.  FOLLOW UP:  1. Please follow up with your primary care physician in one week regarding your hospitalization, bring copies of your discharge paperwork.  2. Please follow up with your surgeon, Dr. De La Cruz in 1-2 weeks.

## 2022-10-22 NOTE — DISCHARGE NOTE NURSING/CASE MANAGEMENT/SOCIAL WORK - PATIENT PORTAL LINK FT
You can access the FollowMyHealth Patient Portal offered by [facility] by registering at the following website: [email or web address]. By joining [employer]’s FollowMyHealth portal, you will also be able to view your health information using other applications (apps) compatible with our system.

## 2022-10-22 NOTE — DIETITIAN INITIAL EVALUATION ADULT - PERTINENT MEDS FT
MEDICATIONS  (STANDING):  acetaminophen   IVPB .. 1000 milliGRAM(s) IV Intermittent every 6 hours  atorvastatin 10 milliGRAM(s) Oral at bedtime  cefoTEtan  IVPB 2 Gram(s) IV Intermittent once  heparin   Injectable 5000 Unit(s) SubCutaneous every 8 hours  ketorolac   Injectable 15 milliGRAM(s) IV Push every 6 hours  morphine PF Spinal 0.1 milliGRAM(s) IntraThecal. once    MEDICATIONS  (PRN):  diphenhydrAMINE 25 milliGRAM(s) Oral every 4 hours PRN Pruritus  naloxone Injectable 0.1 milliGRAM(s) IV Push every 3 minutes PRN For ANY of the following changes in patient status:  A. RR LESS THAN 10 breaths per minute, B. Oxygen saturation LESS THAN 90%, C. Sedation score of 6  ondansetron Injectable 4 milliGRAM(s) IV Push every 6 hours PRN Nausea  oxyCODONE    IR 2.5 milliGRAM(s) Oral every 4 hours PRN Moderate Pain (4 - 6)  oxyCODONE    IR 5 milliGRAM(s) Oral every 4 hours PRN Severe Pain (7 - 10)

## 2022-10-22 NOTE — DIETITIAN INITIAL EVALUATION ADULT - NS FNS DIET ORDER
Diet, Low Fiber (10-22-22 @ 09:46)  Diet, Low Fiber:   Ensure Surgery Cans or Servings Per Day:  1     Special Instructions for Nursing:  Advance diet to low fiber with 1 ensure surgery if patient tolerates 1 clear liquid tray (10-21-22 @ 17:00)

## 2022-10-22 NOTE — DIETITIAN INITIAL EVALUATION ADULT - ADD RECOMMEND
1) Continue diet as ordered/tolerated: low fiber diet + Ensure Surgery 1x/day  2) Encourage adequate consumption of meals/supplements to optimize protein-energy intake.   3) Monitor nutritional intake/tolerance, weights, labs, hydration status, skin integrity, BM, GI symptoms.   4) Diet education reviewed, reinforce as needed.

## 2022-10-22 NOTE — DIETITIAN INITIAL EVALUATION ADULT - FEEDING SKILL
[facility] Medicine Daily Progress Note    Date of Service  3/2/2021    Chief Complaint  51 y.o. male admitted 2/28/2021 with abdominal pain    Hospital Course    The patient is a 51-year-old male with a past medical history of hypertension, dyslipidemia, prior CVA, PTSD, anxiety/depression who presents to the ED to the ED with chief complaint of abdominal pain and nausea and vomiting.  He states that his symptoms occurred shortly after eating chili dogs.  States that no one else at home had similar complaints.  He has had previous colonoscopies which show extensive diverticuli.  CT scan done in the ED shows evidence of infectious versus inflammatory pancolitis.  He was started on IV antibiotics and volume resuscitation which he continues to tolerate.    Interval Problem Update    3/2/2021: The patient was seen and evaluated at bedside and states that he is tolerating oral intake.  He is having regular bowel movements.  FOBT done in the ED was negative.  Lactic acid continues to trend downwards.  Abdominal pain has significantly improved.  He denies any associated chest pains, palpitations, shortness of breath.  He has been transitioned to oral antibiotic therapy and if he continues to show improvement he will be discharged in the morning.  No other overnight events reported by nursing staff.    Consultants/Specialty  None    Code Status  Full Code    Disposition  Transition to oral antibiotic therapy with possible discharge in the morning.    Review of Systems  Review of Systems   Constitutional: Negative for chills, fever and malaise/fatigue.   HENT: Negative for congestion, hearing loss, sore throat and tinnitus.    Eyes: Negative for blurred vision, double vision, photophobia and discharge.   Respiratory: Negative for cough, hemoptysis, sputum production, shortness of breath and wheezing.    Cardiovascular: Negative for chest pain, palpitations, orthopnea, claudication and leg swelling.   Gastrointestinal:  Negative for abdominal pain, blood in stool, diarrhea, heartburn, melena, nausea and vomiting.   Genitourinary: Negative for dysuria, flank pain, hematuria and urgency.   Musculoskeletal: Negative for back pain, joint pain and myalgias.   Skin: Negative for itching and rash.   Neurological: Negative for dizziness, sensory change, speech change, weakness and headaches.   Endo/Heme/Allergies: Does not bruise/bleed easily.   Psychiatric/Behavioral: Negative for depression and suicidal ideas.        Physical Exam  Temp:  [36.1 °C (97 °F)-36.8 °C (98.2 °F)] 36.7 °C (98.1 °F)  Pulse:  [80-84] 81  Resp:  [18] 18  BP: (110-122)/(53-68) 122/59  SpO2:  [93 %-95 %] 94 %    Physical Exam  Vitals reviewed.   Constitutional:       Appearance: Normal appearance. He is obese.   HENT:      Head: Normocephalic and atraumatic.      Nose: No congestion or rhinorrhea.      Mouth/Throat:      Mouth: Mucous membranes are moist.      Pharynx: Oropharynx is clear.   Eyes:      General: No scleral icterus.     Extraocular Movements: Extraocular movements intact.      Conjunctiva/sclera: Conjunctivae normal.      Pupils: Pupils are equal, round, and reactive to light.   Cardiovascular:      Rate and Rhythm: Normal rate and regular rhythm.      Heart sounds: No murmur. No friction rub. No gallop.    Pulmonary:      Effort: Pulmonary effort is normal. No respiratory distress.      Breath sounds: Normal breath sounds. No stridor. No wheezing, rhonchi or rales.   Abdominal:      General: Abdomen is flat. Bowel sounds are normal. There is no distension.      Palpations: Abdomen is soft. There is no mass.      Tenderness: There is no abdominal tenderness. There is no guarding or rebound.   Musculoskeletal:         General: No swelling, tenderness or deformity.      Cervical back: Neck supple. No rigidity. No muscular tenderness.   Lymphadenopathy:      Cervical: No cervical adenopathy.   Skin:     General: Skin is warm and dry.      Findings: No  "erythema or rash.   Neurological:      General: No focal deficit present.      Mental Status: He is alert and oriented to person, place, and time. Mental status is at baseline.      Cranial Nerves: No cranial nerve deficit.      Sensory: No sensory deficit.      Motor: No weakness.   Psychiatric:         Mood and Affect: Mood normal.         Behavior: Behavior normal.         Thought Content: Thought content normal.         Fluids    Intake/Output Summary (Last 24 hours) at 3/2/2021 1126  Last data filed at 3/2/2021 0900  Gross per 24 hour   Intake 240 ml   Output 300 ml   Net -60 ml       Laboratory  Recent Labs     02/28/21 2252 03/01/21  1043 03/01/21  1419 03/01/21 2051 03/02/21 0227   WBC 22.7* 16.3*  --   --  9.8   RBC 5.09 4.85  --   --  4.21*   HEMOGLOBIN 15.3 14.9 13.5* 13.3* 13.0*   HEMATOCRIT 46.3 45.0 40.7* 38.6* 38.8*   MCV 91.0 92.8  --   --  92.2   MCH 30.1 30.7  --   --  30.9   MCHC 33.0* 33.1*  --   --  33.5*   RDW 41.9 43.3  --   --  43.1   PLATELETCT 296 296  --   --  212   MPV 9.9 9.9  --   --  10.0     Recent Labs     02/28/21 2252 03/01/21  1043 03/02/21 0227   SODIUM 133* 135 132*   POTASSIUM 3.5* 4.4 3.6   CHLORIDE 96 99 100   CO2 21 24 21   GLUCOSE 173* 111* 111*   BUN 21 17 14   CREATININE 1.28 1.07 0.95   CALCIUM 10.2 9.5 9.0                   Imaging  CT-ABDOMEN-PELVIS WITH   Final Result         1.  Changes suggesting pancolitis, could represent infectious or inflammatory etiologies.   2.  Diverticulosis   3.  Atherosclerosis and atherosclerotic coronary artery disease.   4.  Small fat-containing bilateral inguinal hernias   5.  Small fat-containing umbilical hernia   6.  Hepatomegaly           Assessment/Plan  * Pancolitis (HCC)- (present on admission)  Assessment & Plan  -CT shows \"Changes suggesting pancolitis, could represent infectious or inflammatory etiologies\"  -Initial Lactic Acid 2.4 and downtrending  -No history of inflammatory bowel disease however will check ESR, CRP, " fecal calprotectin, stool WBCs  -Patient reports prior colonoscopy and EGD were normal, is unsure of the dates.  Was told that he has diverticulosis.  -He has been transitioned to oral antibiotic therapy with Bactrim and metronidazole    Panic disorder with agoraphobia and mild panic attacks- (present on admission)  Assessment & Plan  -Resume bupropion, buspirone, fluoxetine, clonazepam  -History of PTSD as well depression    Essential hypertension- (present on admission)  Assessment & Plan  Blood pressure is currently well controlled  Hydrochlorothiazide will continue to be held at this time      Hypokalemia- (present on admission)  Assessment & Plan  Resolved  Continue to monitor    Dyslipidemia- (present on admission)  Assessment & Plan  -Continue atorvastatin       VTE prophylaxis: Lovenox       age appropriate assistance

## 2022-10-22 NOTE — DISCHARGE NOTE PROVIDER - CARE PROVIDERS DIRECT ADDRESSES
[email or web address]

## 2022-10-22 NOTE — PROGRESS NOTE ADULT - SUBJECTIVE AND OBJECTIVE BOX
TEAM Surgery Progress Note  Patient is a 62y old  Female who presents with a chief complaint of colon cancer (18 Oct 2022 17:34)      INTERVAL EVENTS: Patient is POD1 s/p laparoscopic right hemicolectomy for cecal polyp. No acute events overnight.      SUBJECTIVE: Patient seen and examined at bedside with surgical team.    OBJECTIVE:    Vital Signs Last 24 Hrs  T(C): 36.7 (22 Oct 2022 00:05), Max: 36.8 (21 Oct 2022 10:44)  T(F): 98.1 (22 Oct 2022 00:05), Max: 98.2 (21 Oct 2022 11:50)  HR: 73 (22 Oct 2022 00:05) (73 - 100)  BP: 133/73 (22 Oct 2022 00:05) (115/61 - 134/84)  BP(mean): 92 (21 Oct 2022 19:45) (81 - 92)  RR: 18 (22 Oct 2022 00:05) (15 - 18)  SpO2: 97% (22 Oct 2022 00:05) (96% - 99%)    Parameters below as of 22 Oct 2022 00:05  Patient On (Oxygen Delivery Method): nasal cannula  O2 Flow (L/min): 2  I&O's Detail    21 Oct 2022 07:01  -  22 Oct 2022 03:07  --------------------------------------------------------  IN:    Lactated Ringers: 80 mL    Oral Fluid: 440 mL  Total IN: 520 mL    OUT:    Indwelling Catheter - Urethral (mL): 565 mL  Total OUT: 565 mL    Total NET: -45 mL      MEDICATIONS  (STANDING):  acetaminophen   IVPB .. 1000 milliGRAM(s) IV Intermittent every 6 hours  atorvastatin 10 milliGRAM(s) Oral at bedtime  cefoTEtan  IVPB 2 Gram(s) IV Intermittent once  heparin   Injectable 5000 Unit(s) SubCutaneous every 8 hours  ketorolac   Injectable 15 milliGRAM(s) IV Push every 6 hours  lactated ringers. 1000 milliLiter(s) (40 mL/Hr) IV Continuous <Continuous>  morphine PF Spinal 0.1 milliGRAM(s) IntraThecal. once    MEDICATIONS  (PRN):  diphenhydrAMINE 25 milliGRAM(s) Oral every 4 hours PRN Pruritus  naloxone Injectable 0.1 milliGRAM(s) IV Push every 3 minutes PRN For ANY of the following changes in patient status:  A. RR LESS THAN 10 breaths per minute, B. Oxygen saturation LESS THAN 90%, C. Sedation score of 6  ondansetron Injectable 4 milliGRAM(s) IV Push every 6 hours PRN Nausea  oxyCODONE    IR 2.5 milliGRAM(s) Oral every 4 hours PRN Moderate Pain (4 - 6)  oxyCODONE    IR 5 milliGRAM(s) Oral every 4 hours PRN Severe Pain (7 - 10)      Physical Exam:  General: NAD, resting comfortably in bed  Neuro: A/O x 3, no focal deficits  Pulmonary: Nonlabored breathing, no respiratory distress  Abdominal: soft, NTND, umbilical incision dressing c moderate ss drainage.  Extremities: WWPy    LABS:                ABO Interpretation: A (10-21-22 @ 11:49)      IMAGING:     TEAM Surgery Progress Note  Patient is a 62y old  Female who presents with a chief complaint of colon cancer (18 Oct 2022 17:34)      INTERVAL EVENTS: Patient is POD1 s/p laparoscopic right hemicolectomy for cecal polyp. No acute events overnight.      SUBJECTIVE: Patient seen and examined at bedside with surgical team. No complaints, pain well controlled, passing flatus and BM. Tolerating diet.     OBJECTIVE:    Vital Signs Last 24 Hrs  T(C): 36.7 (22 Oct 2022 00:05), Max: 36.8 (21 Oct 2022 10:44)  T(F): 98.1 (22 Oct 2022 00:05), Max: 98.2 (21 Oct 2022 11:50)  HR: 73 (22 Oct 2022 00:05) (73 - 100)  BP: 133/73 (22 Oct 2022 00:05) (115/61 - 134/84)  BP(mean): 92 (21 Oct 2022 19:45) (81 - 92)  RR: 18 (22 Oct 2022 00:05) (15 - 18)  SpO2: 97% (22 Oct 2022 00:05) (96% - 99%)    Parameters below as of 22 Oct 2022 00:05  Patient On (Oxygen Delivery Method): nasal cannula  O2 Flow (L/min): 2  I&O's Detail    21 Oct 2022 07:01  -  22 Oct 2022 03:07  --------------------------------------------------------  IN:    Lactated Ringers: 80 mL    Oral Fluid: 440 mL  Total IN: 520 mL    OUT:    Indwelling Catheter - Urethral (mL): 565 mL  Total OUT: 565 mL    Total NET: -45 mL      MEDICATIONS  (STANDING):  acetaminophen   IVPB .. 1000 milliGRAM(s) IV Intermittent every 6 hours  atorvastatin 10 milliGRAM(s) Oral at bedtime  cefoTEtan  IVPB 2 Gram(s) IV Intermittent once  heparin   Injectable 5000 Unit(s) SubCutaneous every 8 hours  ketorolac   Injectable 15 milliGRAM(s) IV Push every 6 hours  lactated ringers. 1000 milliLiter(s) (40 mL/Hr) IV Continuous <Continuous>  morphine PF Spinal 0.1 milliGRAM(s) IntraThecal. once    MEDICATIONS  (PRN):  diphenhydrAMINE 25 milliGRAM(s) Oral every 4 hours PRN Pruritus  naloxone Injectable 0.1 milliGRAM(s) IV Push every 3 minutes PRN For ANY of the following changes in patient status:  A. RR LESS THAN 10 breaths per minute, B. Oxygen saturation LESS THAN 90%, C. Sedation score of 6  ondansetron Injectable 4 milliGRAM(s) IV Push every 6 hours PRN Nausea  oxyCODONE    IR 2.5 milliGRAM(s) Oral every 4 hours PRN Moderate Pain (4 - 6)  oxyCODONE    IR 5 milliGRAM(s) Oral every 4 hours PRN Severe Pain (7 - 10)      Physical Exam:  General: NAD, resting comfortably in bed  Neuro: A/O x 3, no focal deficits  Pulmonary: Nonlabored breathing, no respiratory distress  Abdominal: soft, NTND, umbilical incision dressing c mild ss drainage.  Extremities: WWPy    LABS:                ABO Interpretation: A (10-21-22 @ 11:49)      IMAGING:

## 2022-10-22 NOTE — PHYSICAL THERAPY INITIAL EVALUATION ADULT - PLANNED THERAPY INTERVENTIONS, PT EVAL
pt. demonstrates safe and  independent functional mobility.No acute restorative PT services recommended this time. pt. verbalized understanding/agreed upon. HEP reviewed.

## 2022-10-22 NOTE — PROGRESS NOTE ADULT - SUBJECTIVE AND OBJECTIVE BOX
POST OP DAY  1    SUBJECTIVE:  I'm ok.    PAIN SCALE SCORE: [x] Refer to charted pain scores    THERAPY:  [ x ] Spinal morphine   [  ] Epidural morphine   [  ] IV PCA Hydromorphone 1 mg/ml    OBJECTIVE:  Comfortable Appearing    SEDATION SCORE:	  [ x ] Alert	    [  ] Drowsy        [  ] Arousable	[  ] Asleep	[  ] Unresponsive    Side Effects:	  [ x ] None	     [  ] Nausea        [  ] Pruritus        [  ] Weakness   [  ] Numbness        ASSESSMENT/ PLAN   [   ] Discontinue         [  ] Continue    [ x ] Change to prn Analgesics as per primary service.    DOCUMENTATION & VERIFICATION OF CURRENT MEDS [ x ] Done    COMMENTS: No Headache.

## 2022-10-22 NOTE — DISCHARGE NOTE PROVIDER - NSDCMRMEDTOKEN_GEN_ALL_CORE_FT
atorvastatin 10 mg oral tablet: orally once a day (at bedtime)  oxyCODONE 5 mg oral tablet: 1 tab(s) orally every 6 hours, As Needed -Severe Pain (7 - 10) - for moderate pain MDD:4   Xanax 0.25 mg oral tablet: orally once a day, As Needed

## 2022-10-22 NOTE — DIETITIAN INITIAL EVALUATION ADULT - ORAL INTAKE PTA/DIET HISTORY
no apparent/eating chips and dip
Pt reports good appetite/PO intake PTA  - Micronutrient/Other supplementation: multivitamin, vitamin D, CoQ10  - Protein-energy supplementation: none  - No therapeutic restrictions.   - Food allergies: shellfish (hives)  - No difficulty chewing/swallowing.

## 2022-10-22 NOTE — DIETITIAN INITIAL EVALUATION ADULT - PERSON TAUGHT/METHOD
Provided low-fiber nutrition therapy including importance of avoiding  fiber rich foods, fresh fruits/vegetables, whole grains, and added fiber in processed foods. Discussed chewing foods well and adequate hydration and protein intake. Discussed gradual reintroduction of fiber back into diet once cleared by MD. Pt verbalized understanding and accepted written handout. Patient with no nutrition-related questions at this time. Made aware RD remains available as needed./verbal instruction/individual instruction/teach back - (Patient repeats in own words)/patient instructed

## 2022-10-22 NOTE — DISCHARGE NOTE NURSING/CASE MANAGEMENT/SOCIAL WORK - HISTORY OF COVID-19 VACCINATION
RX PROGRESS NOTE: Vancomycin Therapeutic Drug Monitoring      Indication for therapy: SSTI    ALLERGIES:  No Known Allergies    Most recent height and weight information:  Weight: 108.9 kg (08/03/20 0500)  Height: 5' 7\" (170.2 cm) (08/02/20 1945)    The Following are the calculated  Current Weights for Jed Kern            Adjusted Ideal    83.2 kg 66.1 kg             Labs:  Serum Creatinine and Creatinine Clearance:  Serum creatinine: 2.85 mg/dL (H) 08/03/20 0347  Estimated creatinine clearance: 46.6 mL/min (A)    Maximum Temperature (last 24 hours)     Value Max    Temp  100.07 °F (37.8 °C)        WBC (K/mcL)   Date/Time Value   08/03/2020 0347 30.8 (H)   08/02/2020 1315 15.0 (H)     Microbiology Results     None            Assessment/Plan:  Briefly, this is a 25 year old male started on vancomycin for SSTI, with a target serum trough concentration of 10-15 mcg/mL.  Initial dosing regimen will be 1250 mg every 24 hours (maintenance dose).    Pharmacy will monitor levels as appropriate.    Pharmacy will continue to monitor patient (renal function, microbiology data, risk factors for adverse events, appropriate duration of therapy), will order/monitor serum levels as appropriate, and will adjust dose if/when necessary.      Thank you,    Karson Palomo RP  8/3/2020 11:12 AM     Yes

## 2022-10-22 NOTE — DISCHARGE NOTE NURSING/CASE MANAGEMENT/SOCIAL WORK - NSDCPEFALRISK_GEN_ALL_CORE
For information on Fall & Injury Prevention, visit: [email or web address] OR  [email or web address] OR  https://www.cdc.gov/steadi/patient.html

## 2022-10-22 NOTE — PROGRESS NOTE ADULT - ATTENDING COMMENTS
Looks and feels well.  Tolerating PO with gas and stool.  If tolerates LRD, voids, and pain well controlled then DC home this evening or tomorrow morning per ERP

## 2022-10-22 NOTE — DIETITIAN INITIAL EVALUATION ADULT - PERTINENT LABORATORY DATA
10-22    136  |  100  |  14  ----------------------------<  109<H>  4.2   |  24  |  0.76    Ca    9.2      22 Oct 2022 07:57  Phos  4.3     10-22  Mg     2.0     10-22    POCT Blood Glucose.: 223 mg/dL (10-21-22 @ 10:42)  A1C with Estimated Average Glucose Result: 5.2 % (10-18-22 @ 19:51)

## 2022-10-22 NOTE — DIETITIAN INITIAL EVALUATION ADULT - NSICDXPASTMEDICALHX_GEN_ALL_CORE_FT
PAST MEDICAL HISTORY:  2019 novel coronavirus disease (COVID-19) 2/2022 mild, no hospital stay    Anxiety     Colon adenocarcinoma     MVP (mitral valve prolapse)

## 2022-10-22 NOTE — DIETITIAN INITIAL EVALUATION ADULT - ENERGY INTAKE
Tolerated clear liquid diet well x 2 days, no N/V reported. Diet advanced today 10/22 to low fiber diet + Ensure Surgery 1x/day.  Poor (<50%)

## 2022-10-22 NOTE — DISCHARGE NOTE PROVIDER - CARE PROVIDER_API CALL
Tye De La Cruz)  ColonRectal Surgery; Surgery  [address], [address]  [place] [address]  Phone: [phone]  Fax: [phone]  Follow Up Time: 2 weeks

## 2022-10-22 NOTE — DISCHARGE NOTE PROVIDER - HOSPITAL COURSE
62 year old female with a history of hyperlipidemia, anxiety, colonoscopy indicative of cecal polyp, presented for laparoscopic right hemicolectomy. She was advanced from clear liquid diet to low fiber diet without issue. Patient is tolerating her diet, stable, passing flatus and having bowel movements. Patient is comfortable with discharge and has no concerns.

## 2022-10-22 NOTE — PHYSICAL THERAPY INITIAL EVALUATION ADULT - ADDITIONAL COMMENTS
Patient lives in pvt house with family 6  steps to enter, 2 flights inside.    Patient ambulated without AD independent.

## 2022-11-01 LAB — SURGICAL PATHOLOGY STUDY: SIGNIFICANT CHANGE UP

## 2022-11-03 ENCOUNTER — APPOINTMENT (OUTPATIENT)
Dept: SURGERY | Facility: CLINIC | Age: 62
End: 2022-11-03

## 2022-11-03 VITALS
RESPIRATION RATE: 16 BRPM | DIASTOLIC BLOOD PRESSURE: 81 MMHG | SYSTOLIC BLOOD PRESSURE: 122 MMHG | HEART RATE: 87 BPM | OXYGEN SATURATION: 99 % | TEMPERATURE: 96.4 F

## 2022-11-03 PROCEDURE — 99024 POSTOP FOLLOW-UP VISIT: CPT

## 2022-11-03 NOTE — PHYSICAL EXAM
[Abdomen Masses] : No abdominal masses [Abdomen Tenderness] : ~T No ~M abdominal tenderness [de-identified] : Normal wound healing

## 2022-11-03 NOTE — ASSESSMENT
[FreeTextEntry1] : Pathology reviewed.  Patient will advance diet.  Oncology consult.  Follow-up 6 weeks.

## 2022-11-03 NOTE — HISTORY OF PRESENT ILLNESS
[FreeTextEntry1] : Dione is a 63 y/o female here for a post-op visit. S/P single incision laparoscopic right colectomy on 10/21/22. \par \par Colonoscopy on 10/3/22 by Dr. Sanches - one approximately 22mm flat sessile polyp was found in the cecum. Endoscopic mucosal resection utlizing a Hybrid EMR/ESD technique was performed. Resection and retrieval were complete. 2 x Hemostatic clips were placed post polypectomy for hemostasis (MR conditional). Pathology - 1. Colon, cecum, polyp: invasive well differentiated adenocarcinoma arising in the tubular adenoma with focal high grade dysplasia. Carcinoma invades the submucosal and is present less than 0.5mm from the nearest deep cauterized tissue edge. Negative for lymphovascular invasion. \par \par Today pt reports feeling no pain.  A little sensitivity from moving BM.  Currently no pain with BM.  Pt uses Aquaphor for some relief.  Pasty, soft BMs daily.  Pt denies swollen tissue.   No episodes of incontinence of stool or flatus.  Good appetite.  No c/o nausea/vomiting.  Denies fever and chills.  Not taking any anticoagulants.  Incision site clean dry intact with gauze and tape.  \par

## 2022-11-07 ENCOUNTER — NON-APPOINTMENT (OUTPATIENT)
Age: 62
End: 2022-11-07

## 2022-11-09 RX ORDER — NEOMYCIN SULFATE 500 MG/1
500 TABLET ORAL
Qty: 3 | Refills: 0 | Status: DISCONTINUED | COMMUNITY
Start: 2022-10-14 | End: 2022-11-09

## 2022-11-09 RX ORDER — ATORVASTATIN CALCIUM 80 MG/1
TABLET, FILM COATED ORAL
Refills: 0 | Status: DISCONTINUED | COMMUNITY
End: 2022-11-09

## 2022-11-09 RX ORDER — METRONIDAZOLE 250 MG/1
250 TABLET ORAL
Qty: 3 | Refills: 0 | Status: DISCONTINUED | COMMUNITY
Start: 2022-10-14 | End: 2022-11-09

## 2022-11-10 ENCOUNTER — OUTPATIENT (OUTPATIENT)
Dept: OUTPATIENT SERVICES | Facility: HOSPITAL | Age: 62
LOS: 1 days | Discharge: HOME | End: 2022-11-10

## 2022-11-10 DIAGNOSIS — Z90.89 ACQUIRED ABSENCE OF OTHER ORGANS: Chronic | ICD-10-CM

## 2022-11-10 DIAGNOSIS — Z98.891 HISTORY OF UTERINE SCAR FROM PREVIOUS SURGERY: Chronic | ICD-10-CM

## 2022-11-10 DIAGNOSIS — Z98.890 OTHER SPECIFIED POSTPROCEDURAL STATES: Chronic | ICD-10-CM

## 2022-11-10 DIAGNOSIS — C18.9 MALIGNANT NEOPLASM OF COLON, UNSPECIFIED: ICD-10-CM

## 2022-11-11 ENCOUNTER — NON-APPOINTMENT (OUTPATIENT)
Age: 62
End: 2022-11-11

## 2022-11-15 ENCOUNTER — NON-APPOINTMENT (OUTPATIENT)
Age: 62
End: 2022-11-15

## 2022-11-15 ENCOUNTER — APPOINTMENT (OUTPATIENT)
Dept: HEMATOLOGY ONCOLOGY | Facility: CLINIC | Age: 62
End: 2022-11-15
Payer: COMMERCIAL

## 2022-11-15 VITALS
OXYGEN SATURATION: 99 % | SYSTOLIC BLOOD PRESSURE: 137 MMHG | RESPIRATION RATE: 16 BRPM | HEART RATE: 91 BPM | BODY MASS INDEX: 22.22 KG/M2 | WEIGHT: 134.99 LBS | HEIGHT: 65.5 IN | DIASTOLIC BLOOD PRESSURE: 80 MMHG | TEMPERATURE: 97.5 F

## 2022-11-15 PROCEDURE — 99205 OFFICE O/P NEW HI 60 MIN: CPT

## 2022-11-15 PROCEDURE — 99204 OFFICE O/P NEW MOD 45 MIN: CPT

## 2022-11-15 NOTE — HISTORY OF PRESENT ILLNESS
[Disease: _____________________] : Disease: [unfilled] [T: ___] : T[unfilled] [N: ___] : N[unfilled] [M: ___] : M[unfilled] [AJCC Stage: ____] : AJCC Stage: [unfilled] [de-identified] : 62 year old female with past medical history of HLD presenting to the office for an initial consultation of colon CA.\par \par Colonoscopy on 10/3/22 by Dr. Sanches - one approximately 22mm flat sessile polyp was found in the cecum. Endoscopic mucosal resection utlizing a Hybrid EMR/ESD technique was performed. Resection and retrieval were complete. 2 x Hemostatic clips were placed post polypectomy for hemostasis (MR conditional). Pathology - 1. Colon, cecum, polyp: invasive well differentiated adenocarcinoma arising in the tubular adenoma with focal high grade dysplasia. Carcinoma invades the submucosal and is present less than 0.5mm from the nearest deep cauterized tissue edge. Negative for lymphovascular invasion. \par \par She is s/p single incision laparoscopic right colectomy on 10/21/22 with Dr. De La Cruz.\par \par Pathology:\par Terminal ileum, appendix, cecum, ascending colon, right hemicolectomy\par - Invasive adenocarcinoma, moderately differentiated, involving cecum\par - Resection margins are negative for carcinoma\par - Thirty-five lymph nodes, negative for carcinoma (0/35)\par \par - AJCC Eighth Edition Pathologic stage pT2N0\par \par MLH1 : Loss of nuclear expression\par PMS2 : Loss of nuclear expression\par MSH2 :  Intact nuclear expression\par MSH6:   Intact nuclear expression\par \par  [de-identified] : Invasive adenocarcinoma, moderately differentiated, involving cecum [de-identified] : Colorectal Surgeon: Tye De La Cruz\par

## 2022-11-23 PROBLEM — I34.1 NONRHEUMATIC MITRAL (VALVE) PROLAPSE: Chronic | Status: ACTIVE | Noted: 2022-10-18

## 2022-11-23 PROBLEM — F41.9 ANXIETY DISORDER, UNSPECIFIED: Chronic | Status: ACTIVE | Noted: 2022-10-18

## 2022-11-23 PROBLEM — U07.1 COVID-19: Chronic | Status: ACTIVE | Noted: 2022-10-18

## 2022-11-23 PROBLEM — C18.9 MALIGNANT NEOPLASM OF COLON, UNSPECIFIED: Chronic | Status: ACTIVE | Noted: 2022-10-18

## 2022-12-02 ENCOUNTER — APPOINTMENT (OUTPATIENT)
Dept: OBGYN | Facility: CLINIC | Age: 62
End: 2022-12-02

## 2022-12-02 ENCOUNTER — ASOB RESULT (OUTPATIENT)
Age: 62
End: 2022-12-02

## 2022-12-02 PROCEDURE — 76830 TRANSVAGINAL US NON-OB: CPT

## 2022-12-12 LAB — SURGICAL PATHOLOGY STUDY: SIGNIFICANT CHANGE UP

## 2022-12-13 ENCOUNTER — NON-APPOINTMENT (OUTPATIENT)
Age: 62
End: 2022-12-13

## 2022-12-14 ENCOUNTER — NON-APPOINTMENT (OUTPATIENT)
Age: 62
End: 2022-12-14

## 2022-12-15 ENCOUNTER — APPOINTMENT (OUTPATIENT)
Dept: SURGERY | Facility: CLINIC | Age: 62
End: 2022-12-15

## 2022-12-15 VITALS
RESPIRATION RATE: 17 BRPM | SYSTOLIC BLOOD PRESSURE: 136 MMHG | DIASTOLIC BLOOD PRESSURE: 79 MMHG | HEART RATE: 82 BPM | OXYGEN SATURATION: 97 % | TEMPERATURE: 97.5 F

## 2022-12-15 DIAGNOSIS — C18.9 MALIGNANT NEOPLASM OF COLON, UNSPECIFIED: ICD-10-CM

## 2022-12-15 DIAGNOSIS — Z09 ENCOUNTER FOR FOLLOW-UP EXAMINATION AFTER COMPLETED TREATMENT FOR CONDITIONS OTHER THAN MALIGNANT NEOPLASM: ICD-10-CM

## 2022-12-15 PROCEDURE — 99024 POSTOP FOLLOW-UP VISIT: CPT

## 2022-12-15 NOTE — HISTORY OF PRESENT ILLNESS
[FreeTextEntry1] : Dione is a 63 y/o female here for a post-op visit. S/P single incision laparoscopic right colectomy on 10/21/22. Pathology - Terminal ileum, appendix, cecum, ascending colon, right hemicolectomy: invasive adenocarcinoma, moderately differentiated, involving cecum. Resection margins are negative for carcinoma. Thirty-five lymph nodes, negative for carcinoma (0/35). AJCC Eight Edition Pathologic stage pT2N0.  \par \par Colonoscopy on 10/3/22 by Dr. Sanches - one approximately 22mm flat sessile polyp was found in the cecum. Endoscopic mucosal resection utlizing a Hybrid EMR/ESD technique was performed. Resection and retrieval were complete. 2 x Hemostatic clips were placed post polypectomy for hemostasis (MR conditional). Pathology - 1. Colon, cecum, polyp: invasive well differentiated adenocarcinoma arising in the tubular adenoma with focal high grade dysplasia. Carcinoma invades the submucosal and is present less than 0.5mm from the nearest deep cauterized tissue edge. Negative for lymphovascular invasion. \par \par Today pt reports no pain. Pain managed with Tylenol / Motrin. Denies drainage, bleeding, swelling, and redness of surgical incision. Denies nausea and vomiting. Denies fever and chills. Daily BM, formed, denies any bleeding or swelling. Good appetite. Takes baby aspirin for prevention.

## 2022-12-15 NOTE — PHYSICAL EXAM
[Abdomen Masses] : No abdominal masses [Abdomen Tenderness] : ~T No ~M abdominal tenderness [de-identified] : Normal wound healing

## 2022-12-15 NOTE — ASSESSMENT
[FreeTextEntry1] : Patient has recovered normally.  Wounds healing normally.  Pathology and genetics reviewed.  Patient will seek genetic counseling.  All questions answered.  Follow-up colonoscopy 1 year from last exam.

## 2022-12-21 ENCOUNTER — APPOINTMENT (OUTPATIENT)
Dept: HEMATOLOGY ONCOLOGY | Facility: CLINIC | Age: 62
End: 2022-12-21

## 2023-01-11 NOTE — DISCUSSION/SUMMARY
[FreeTextEntry1] : The visit was provided via telehealth using real-time 2-way audio visual technology. The patient, Dione Gill, was located at home in [place], [place] at the time of the visit. The provider, Helen Moore, was located at the medical office in [place], [place] at the time of the visit. Verbal consent for telehealth services was given on 22 by the patient, Dione Gill.\par \par REASON FOR CONSULT\par Dione Gill is a 62-year-old female referred by Dr. Jhonathan Bowling for cancer genetic counseling and risk assessment due to a personal history of colon cancer. Ms. Gill was seen via telehealth on 2022 at which time medical and family history was ascertained and a pedigree constructed. \par \par RELEVANT MEDICAL HISTORY\par Ms. Gill was diagnosed with colorectal cancer in  at the age of 62. Pathology report revealed invasive adenocarcinoma. Consequently, she underwent laparoscopic right hemicolectomy. 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 MLH1 hypermethylation was negative and the cancer was also BRAF mutation negative.\par \par Ms. Gill previously pursued genetic testing in 2019 due to her family history of cancer using [facility]s Hereditary Cancer Test (30 genes) which was negative for any genetic variants (including Ramirez Syndrome genes). \par \par OTHER MEDICAL AND SURGICAL HISTORY:\par •	Medical History: no major medical history reported\par •	Surgical History: \par \par OB/GYN HISTORY:\par Obstetrical History: \par Age at Menarche: 13\par Menopausal Status: Post-menopausal with LMP at age 48\par Age at First Live Birth: 29\par Oral Contraceptive Use: Yes, 2 months\par Hormone Replacement Therapy: Yes, vaginal cream as needed\par \par CANCER SCREENING HISTORY:  \par Breast: \par •	Mammography: 3/22/22- wnl\par •	Sonography: 3/22/22- wnl\par •	MRI: No\par •	Biopsies: No\par GYN:\par •	Pelvic Examination: Annual- history of ovarian cyst, monitored via annual TVUS\par Colon:\par •	Colonoscopy: see above, history of less than 10 polyps total\par •	Upper Endoscopy: No\par Skin:  \par •	FBSE: Yes\par •	Lesions biopsied/removed: Yes- basal 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 A detailed family history of cancer was ascertained, see below and scanned chart for pedigree. \par \par According to Ms. Gill no one else in the family has had germline testing for cancer susceptibility. Consanguinity was denied. \par 	\par RISK ASSESSMENT:\par Ms. 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 The patient meets National Comprehensive Cancer Network (NCCN) criteria for genetic testing. \par \par Although Ms. Gill previously had negative genetic testing for Ramirez Syndrome genes using [facility] Hereditary Cancer Risk Test, we discussed the testing that was performed is not comprehensive for LS, specifically for the PMS2 gene as exons 12-15 were not analyzed. We recommend repeat testing using a different lab and recommended genetic testing through [facility] for genes associated with breast, gynecological, and colorectal cancers. She declined additional testing for genes associated with pancreatic cancer. This test analyzes 29 genes: APC, DEETPI, AXIN2, BARD1, BMPR1A, BRCA1, BRCA2, BRIP1, CDH1, CHEK2, EPCAM, GREM1, MLH1, MSH2, MSH3, MSH6, MUTYH, NF1, NTHL1, PALB2, PMS2, POLD1, POLE, PTEN, RAD51C, RAD51D, SMAD4, STK11, and TP53.\par \par The risks, benefits and limitations of genetic testing were discussed with Ms. Gill. In addition, we discussed the purpose of genetic testing and possible test results (positive, negative, inconclusive) along with associated medical management options and psychosocial implications. Insurance coverage and potential out of pocket costs were also discussed. \par \par It was explained that risk assessment is based upon medical and family history as provided and may change in the future should new information be obtained. \par \par Following our discussion, Ms. Gill verbally consented to the above-mentioned genetic testing panel. [facility] will be sending Ms. Gill a saliva kit to her home to submit a sample for genetic testing. \par \par PLAN:\par 1.	A saliva kit was ordered today through [facility] and will be sent to Ms. Gill to submit a sample for genetic testing.\par 2.	We will contact Ms. Gill to schedule a follow-up appointment once the results are available. Results generally return 2-3 weeks after the laboratory has received the patient’s saliva sample. \par \par For any additional questions please call Cancer Genetics at [phone]. \par \par \par Helen Moore MS, [facility]\par Genetic Counselor, Cancer Genetics\par \par \par \par \par

## 2023-01-13 ENCOUNTER — APPOINTMENT (OUTPATIENT)
Dept: HEMATOLOGY ONCOLOGY | Facility: CLINIC | Age: 63
End: 2023-01-13

## 2023-01-24 ENCOUNTER — APPOINTMENT (OUTPATIENT)
Dept: OBGYN | Facility: CLINIC | Age: 63
End: 2023-01-24
Payer: COMMERCIAL

## 2023-01-24 VITALS
HEIGHT: 65 IN | SYSTOLIC BLOOD PRESSURE: 130 MMHG | WEIGHT: 136 LBS | DIASTOLIC BLOOD PRESSURE: 70 MMHG | BODY MASS INDEX: 22.66 KG/M2

## 2023-01-24 DIAGNOSIS — Z01.411 ENCOUNTER FOR GYNECOLOGICAL EXAMINATION (GENERAL) (ROUTINE) WITH ABNORMAL FINDINGS: ICD-10-CM

## 2023-01-24 DIAGNOSIS — N83.209 UNSPECIFIED OVARIAN CYST, UNSPECIFIED SIDE: ICD-10-CM

## 2023-01-24 PROCEDURE — 99396 PREV VISIT EST AGE 40-64: CPT

## 2023-01-26 NOTE — DISCUSSION/SUMMARY
[FreeTextEntry1] : The visit was provided via telehealth using real-time 2-way audio visual technology. The patient, Dione Gill, was located at home in [place], [place] at the time of the visit. The provider, Helen Moore, was located at the medical office in [place], [place] at the time of the visit. Verbal consent for telehealth services was given on 1/13/23 by the patient, Dione Gill.\par \par REASON FOR CONSULT\par Dione Gill is a 63-year-old female who was seen via telehealth on January 13, 2023 for a discussion regarding her negative genetic testing results related to hereditary cancer predisposition. \par \par Ms. Gill was originally seen by the Cancer Genetics Service via telehealth on December 21, 2022 for hereditary cancer predisposition risk assessment due to a personal history of colon cancer that showed absence of MLH1 and PMS2 proteins on IHC analysis. MLH1 hypermethylation was negative and the tumor was also BRAF mutation negative. Although Ms. Gill previously had negative genetic testing through [facility], we discussed the testing that was performed is not comprehensive for Ramirez Syndrome, specifically for the PMS2 gene as exons 12-15 were not analyzed.\par \par Ms. Gill then decided to pursue genetic testing for genes associated with colorectal, breast, and gynecological cancers offered by [facility].\par \par TEST RESULTS: NEGATIVE\par NO pathogenic (disease-causing) variants or variants of uncertain significance were detected in any of the following genes [29]:  APC, DEEPTI, AXIN2, BARD1, BMPR1A, BRCA1, BRCA2, BRIP1, CDH1, CHEK2, EPCAM, GREM1, MLH1, MSH2, MSH3, MSH6, MUTYH, NF1, NTHL1, PALB2, PMS2, POLD1, POLE, PTEN, RAD51C, RAD51D, SMAD4, STK11, and TP53.\par \par RESULTS INTERPRETATION AND ASSESSMENT:\par Given Ms. Gill’s personal and current reported family history of cancer, and her negative genetic test results, the following screening guidelines and risk-reducing recommendations were discussed:\par \par COLORECTAL: \par •	Given Ms. Gill’ negative germline genetic testing, later age at diagnosis, and family history not highly suggestive of Ramirez Syndrome (LS), we believe the loss of MLH1 and PMS2 proteins on her IHC analysis is likely explained by a bi-allelic somatic mismatch repair inactivation event limited to the tumor itself associated with sporadic colorectal cancer and not due to a hereditary cancer syndrome. \par •	Somatic MMR genetic testing on a sample of the tumor may help to further clarify this possibility however this test is not currently available in [place] state at this time. We recommended reconsideration of this testing option if it becomes available at a later date. \par •	Therefore, we recommended long-term management and surveillance should be based on Ms. Gill’s on- or post-treatment protocol as recommended by her gastroenterology team. \par \par ADDITIONAL RECOMMENDATIONS:\par •	As we are not able to conclusively rule out the possibility of Ramirez Syndrome due to an undetectable germline mutation, we recommended Ms. Gill seek prompt evaluation for any abnormal uterine bleeding given the high risk of uterine cancer associated with LS. \par \par OTHER:\par •	In the absence of other indications, Ms. Gill should practice age-appropriate cancer screening of other organ systems as recommended for the general population.\par \par We also discussed the limitations of negative results:\par 1.	The cause of Ms. Gill’s personal and family history of cancer remains unknown. The cancer(s) may have developed randomly, or due to environmental factors.  \par 2.	This negative result does not completely rule out a hereditary basis for the reported personal and/or family history due to limitations in technology or a variant being present in an unidentified gene. \par 3.	Variants in other genes would not be identified by this analysis, so this negative result does not rule out the likelihood of having a mutation in a different hereditary cancer gene or the possibility of ever developing cancer.\par 4.	It is possible there is a hereditary cancer predisposition gene mutation in the family, but the patient did not inherit it. \par \par We informed Ms. Gill that our knowledge of genetics and inherited cancer conditions is changing rapidly. Therefore, we recommended that Ms. Gill contact our office, every 2 to 3 years, to discuss relevant advances in cancer genetics.  We emphasized the importance of re-contacting us with updates regarding her personal and family history of cancer as well as any updates regarding additional cancer genetic test results performed for the patient and/or family members.  Such updates could possibly change our risk assessment and recommendations. \par \par FAMILY MEMBER RECOMMENDATIONS:\par In addition, we recommended Ms. Gill’s children begin colon screening via colonoscopy at the age of 40. Intervals should be as per recommendations from their treating gastroenterologist however, these recommendations may change if further evidence in her personal and family history suggests Ms. Gill’ colorectal cancer is associated with Ramirez Syndrome due to an undetectable germline mutation. \par \par PLAN:\par 1.	These results do not change Ms. Gill’s medical management. Long-term management and surveillance should be based on the patient’s on- or post-treatment protocol as recommended by her oncologist (and general population guidelines for other cancers).\par 2.	Based on her family history of [cancer type], the patient may consider increased screening via [screening type] (see discussion above) at the discretion of their PCP.\par 3.	Patient informed consult note(s) will be available through their [facility] patient portal and genetic test results will be released via [[facility]’s portal.\par 4.	Ms. Gill was encouraged to contact us every 2-3 years to discuss relevant advances in cancer genetics, or sooner if there are any changes in her personal or family history of cancer.\par \par \par For any additional questions please call Cancer Genetics at [phone]. \par \par \par Helen Moore MS, [facility]\par Genetic Counselor, Cancer Genetics\par \par \par \par \par \par

## 2023-01-29 LAB — CYTOLOGY CVX/VAG DOC THIN PREP: ABNORMAL

## 2023-02-07 ENCOUNTER — TRANSCRIPTION ENCOUNTER (OUTPATIENT)
Age: 63
End: 2023-02-07

## 2023-04-13 NOTE — DIETITIAN INITIAL EVALUATION ADULT - REASON
no overt signs of muscle/fat depletion upon visual observation; nutrition focused physical examination not indicated at this time  No

## 2023-07-08 ENCOUNTER — NON-APPOINTMENT (OUTPATIENT)
Age: 63
End: 2023-07-08

## 2023-07-11 DIAGNOSIS — Z12.11 ENCOUNTER FOR SCREENING FOR MALIGNANT NEOPLASM OF COLON: ICD-10-CM

## 2023-10-16 ENCOUNTER — APPOINTMENT (OUTPATIENT)
Dept: SURGERY | Facility: HOSPITAL | Age: 63
End: 2023-10-16
Payer: COMMERCIAL

## 2023-10-16 ENCOUNTER — TRANSCRIPTION ENCOUNTER (OUTPATIENT)
Age: 63
End: 2023-10-16

## 2023-10-16 ENCOUNTER — OUTPATIENT (OUTPATIENT)
Dept: OUTPATIENT SERVICES | Facility: HOSPITAL | Age: 63
LOS: 1 days | End: 2023-10-16
Payer: COMMERCIAL

## 2023-10-16 VITALS
HEART RATE: 67 BPM | SYSTOLIC BLOOD PRESSURE: 150 MMHG | RESPIRATION RATE: 15 BRPM | OXYGEN SATURATION: 100 % | DIASTOLIC BLOOD PRESSURE: 78 MMHG

## 2023-10-16 VITALS
OXYGEN SATURATION: 100 % | RESPIRATION RATE: 17 BRPM | HEART RATE: 79 BPM | TEMPERATURE: 98 F | SYSTOLIC BLOOD PRESSURE: 156 MMHG | DIASTOLIC BLOOD PRESSURE: 90 MMHG | HEIGHT: 66 IN | WEIGHT: 138.01 LBS

## 2023-10-16 DIAGNOSIS — C18.9 MALIGNANT NEOPLASM OF COLON, UNSPECIFIED: ICD-10-CM

## 2023-10-16 DIAGNOSIS — Z98.890 OTHER SPECIFIED POSTPROCEDURAL STATES: Chronic | ICD-10-CM

## 2023-10-16 DIAGNOSIS — Z98.891 HISTORY OF UTERINE SCAR FROM PREVIOUS SURGERY: Chronic | ICD-10-CM

## 2023-10-16 DIAGNOSIS — Z90.89 ACQUIRED ABSENCE OF OTHER ORGANS: Chronic | ICD-10-CM

## 2023-10-16 PROCEDURE — 45378 DIAGNOSTIC COLONOSCOPY: CPT

## 2023-10-16 DEVICE — NET RETRV ROT ROTH 2.5MMX230CM: Type: IMPLANTABLE DEVICE | Status: FUNCTIONAL

## 2023-10-16 RX ORDER — SODIUM CHLORIDE 9 MG/ML
500 INJECTION INTRAMUSCULAR; INTRAVENOUS; SUBCUTANEOUS
Refills: 0 | Status: COMPLETED | OUTPATIENT
Start: 2023-10-16 | End: 2023-10-16

## 2023-10-16 RX ORDER — ALPRAZOLAM 0.25 MG
0 TABLET ORAL
Qty: 0 | Refills: 0 | DISCHARGE

## 2023-10-16 RX ORDER — ATORVASTATIN CALCIUM 80 MG/1
0 TABLET, FILM COATED ORAL
Qty: 0 | Refills: 0 | DISCHARGE

## 2023-10-16 RX ADMIN — SODIUM CHLORIDE 30 MILLILITER(S): 9 INJECTION INTRAMUSCULAR; INTRAVENOUS; SUBCUTANEOUS at 08:57

## 2023-10-16 NOTE — ASU DISCHARGE PLAN (ADULT/PEDIATRIC) - CARE PROVIDER_API CALL
Tye De La Cruz  Colon/Rectal Surgery  [address] [place] [address], [facility] [address]  [place] [address]  Phone: [phone]  Fax: [phone]  Follow Up Time: 1 week   Tye De La Cruz  Colon/Rectal Surgery  [address] [place] [address], [facility] [address]  [place] [address]  Phone: [phone]  Fax: [phone]  Follow Up Time:

## 2023-10-16 NOTE — PRE PROCEDURE NOTE - PRE PROCEDURE EVALUATION
Attending Physician: Dorothy                           Procedure: Colonoscopy     Indication for Procedure: Screening   ________________________________________________________  PAST MEDICAL & SURGICAL HISTORY:  Colon adenocarcinoma  MVP (mitral valve prolapse)  Anxiety   novel coronavirus disease (COVID-19)  2022 mild, no hospital stay  S/P  x2  S/P tonsillectomy  H/O colonoscopy      ALLERGIES:  No Known Drug Allergies  shellfish (Other)      HOME MEDICATIONS:  atorvastatin 10 mg oral tablet: orally once a day (at bedtime)  Xanax 0.25 mg oral tablet: orally once a day, As Needed    AICD/PPM: [ ] yes   [x] no      PHYSICAL EXAMINATION:    Height (cm): 167.6  Weight (kg): 62.6  BMI (kg/m2): 22.3  BSA (m2): 1.71T(C): --  HR: --  BP: --  RR: --  SpO2: --    Constitutional: NAD  Respiratory: nonlabored  Cardiovascular: normotensive, regular rate   Gastrointestinal: BS+, soft, NT/ND  Extremities: No peripheral edema  Neurological: A/O x 3, no focal deficits  Psychiatric: Normal mood, normal affect  Skin: No rashes      COMMENTS:    The patient is a suitable candidate for the planned procedure unless box checked [ ]  No, explain:

## 2023-10-16 NOTE — ASU DISCHARGE PLAN (ADULT/PEDIATRIC) - NS MD DC FALL RISK RISK
For information on Fall & Injury Prevention, visit: [email or web address] OR  [email or web address] OR  https://www.cdc.gov/steadi/patient.html

## 2023-10-30 ENCOUNTER — ASOB RESULT (OUTPATIENT)
Age: 63
End: 2023-10-30

## 2023-10-30 ENCOUNTER — APPOINTMENT (OUTPATIENT)
Dept: OBGYN | Facility: CLINIC | Age: 63
End: 2023-10-30
Payer: COMMERCIAL

## 2023-10-30 PROCEDURE — 76830 TRANSVAGINAL US NON-OB: CPT

## 2024-01-23 ENCOUNTER — APPOINTMENT (OUTPATIENT)
Dept: OBGYN | Facility: CLINIC | Age: 64
End: 2024-01-23
Payer: COMMERCIAL

## 2024-01-23 VITALS
BODY MASS INDEX: 22.66 KG/M2 | DIASTOLIC BLOOD PRESSURE: 74 MMHG | WEIGHT: 136 LBS | SYSTOLIC BLOOD PRESSURE: 126 MMHG | HEIGHT: 65 IN

## 2024-01-23 DIAGNOSIS — Z01.419 ENCOUNTER FOR GYNECOLOGICAL EXAMINATION (GENERAL) (ROUTINE) W/OUT ABNORMAL FINDINGS: ICD-10-CM

## 2024-01-23 PROCEDURE — 99396 PREV VISIT EST AGE 40-64: CPT

## 2024-01-23 RX ORDER — UBIDECARENONE 200 MG
200 CAPSULE ORAL
Refills: 0 | Status: DISCONTINUED | COMMUNITY
End: 2024-01-23

## 2024-01-23 RX ORDER — ASPIRIN 81 MG
81 TABLET, DELAYED RELEASE (ENTERIC COATED) ORAL DAILY
Refills: 0 | Status: DISCONTINUED | COMMUNITY
End: 2024-01-23

## 2024-01-23 RX ORDER — SODIUM PICOSULFATE, MAGNESIUM OXIDE, AND ANHYDROUS CITRIC ACID 10; 3.5; 12 MG/160ML; G/160ML; G/160ML
10-3.5-12 MG-GM LIQUID ORAL
Qty: 1 | Refills: 0 | Status: DISCONTINUED | COMMUNITY
Start: 2023-07-11 | End: 2024-01-23

## 2024-01-23 RX ORDER — MULTIVIT-MIN/FOLIC/VIT K/LYCOP 400-300MCG
50 MCG TABLET ORAL
Qty: 30 | Refills: 5 | Status: DISCONTINUED | COMMUNITY
End: 2024-01-23

## 2024-01-29 LAB — CYTOLOGY CVX/VAG DOC THIN PREP: ABNORMAL

## 2024-12-04 ENCOUNTER — ASOB RESULT (OUTPATIENT)
Age: 64
End: 2024-12-04

## 2024-12-04 ENCOUNTER — APPOINTMENT (OUTPATIENT)
Dept: OBGYN | Facility: CLINIC | Age: 64
End: 2024-12-04
Payer: COMMERCIAL

## 2024-12-04 PROCEDURE — 76830 TRANSVAGINAL US NON-OB: CPT

## 2024-12-05 ENCOUNTER — NON-APPOINTMENT (OUTPATIENT)
Age: 64
End: 2024-12-05

## 2025-04-09 ENCOUNTER — APPOINTMENT (OUTPATIENT)
Dept: OBGYN | Facility: CLINIC | Age: 65
End: 2025-04-09

## 2025-04-09 VITALS
DIASTOLIC BLOOD PRESSURE: 80 MMHG | BODY MASS INDEX: 22.99 KG/M2 | SYSTOLIC BLOOD PRESSURE: 142 MMHG | WEIGHT: 138 LBS | HEIGHT: 65 IN

## 2025-04-09 DIAGNOSIS — N83.202 UNSPECIFIED OVARIAN CYST, LEFT SIDE: ICD-10-CM

## 2025-04-09 DIAGNOSIS — Z01.411 ENCOUNTER FOR GYNECOLOGICAL EXAMINATION (GENERAL) (ROUTINE) WITH ABNORMAL FINDINGS: ICD-10-CM

## 2025-04-09 PROCEDURE — G0101: CPT

## 2025-04-14 LAB — CYTOLOGY CVX/VAG DOC THIN PREP: ABNORMAL

## (undated) DEVICE — ATTACHMENT DISTAL 4X15MM

## (undated) DEVICE — FOLEY HOLDER STATLOCK 2 WAY ADULT

## (undated) DEVICE — INSUFFLATION NDL COVIDIEN STEP 14G FOR STEP/VERSASTEP

## (undated) DEVICE — CATH IV SAFE BC 22G X 1" (BLUE)

## (undated) DEVICE — SOL INJ NS 0.9% 500ML 2 PORT

## (undated) DEVICE — SNARE EXACTO COLD 9MMX230CM

## (undated) DEVICE — SUT CAPROSYN 4-0 30" P-12 UNDYED

## (undated) DEVICE — STAPLER SKIN VISI-STAT 35 WIDE

## (undated) DEVICE — SUT SOFSILK 3-0 18" V-20 (POP-OFF)

## (undated) DEVICE — PREP CHLORAPREP HI-LITE ORANGE 26ML

## (undated) DEVICE — POSITIONER PINK PAD PIGAZZI SYSTEM

## (undated) DEVICE — SHEARS HARMONIC ACE 5MM X 36CM CURVED TIP

## (undated) DEVICE — ELCTR ECG CONDUCTIVE ADHESIVE

## (undated) DEVICE — BLADE SCALPEL SAFETYLOCK #10

## (undated) DEVICE — GLV 7.5 PROTEXIS (WHITE)

## (undated) DEVICE — SPECIMEN CONTAINER 100ML

## (undated) DEVICE — GELPORT LAPAROSCOPIC SYSTEM

## (undated) DEVICE — BASIN EMESIS 10IN GRADUATED MAUVE

## (undated) DEVICE — BLADE SCALPEL SAFETYLOCK #15

## (undated) DEVICE — DRAIN PENROSE .25" X 18" LATEX

## (undated) DEVICE — DRAPE GENERAL ENDOSCOPY

## (undated) DEVICE — TROCAR COVIDIEN VERSASTEP PLUS 12MM STANDARD

## (undated) DEVICE — GLV 6.5 PROTEXIS (WHITE)

## (undated) DEVICE — STAPLER COVIDIEN ENDO GIA STANDARD HANDLE

## (undated) DEVICE — GOWN LG

## (undated) DEVICE — Device

## (undated) DEVICE — LINE BREATHE SAMPLNG

## (undated) DEVICE — PACK IV START WITH CHG

## (undated) DEVICE — TUBING IV SET GRAVITY 3Y 100" MACRO

## (undated) DEVICE — CLAMP BX HOT RAD JAW 3

## (undated) DEVICE — DRSG 2X2

## (undated) DEVICE — POLY TRAP ETRAP

## (undated) DEVICE — BIOPSY FORCEP COLD DISP

## (undated) DEVICE — ELCTR GROUNDING PAD ADULT COVIDIEN

## (undated) DEVICE — TUBING SUCTION CONN 6FT STERILE

## (undated) DEVICE — LIGASURE MARYLAND 37CM

## (undated) DEVICE — DRAPE 1/2 SHEET 40X57"

## (undated) DEVICE — DRAPE TOWEL BLUE 17" X 24"

## (undated) DEVICE — SOL IRR POUR H2O 250ML

## (undated) DEVICE — SHEARS COVIDIEN ENDO SHEAR 5MM X 31CM W UNIPOLAR CAUTERY

## (undated) DEVICE — GLV 8 PROTEXIS (WHITE)

## (undated) DEVICE — FORCEP RADIAL JAW 4 JUMBO 2.8MM 3.2MM 240CM ORANGE DISP

## (undated) DEVICE — DRSG OPSITE 13.75 X 4"

## (undated) DEVICE — SUT SOFSILK 3-0 30" V-20

## (undated) DEVICE — POSITIONER FOAM EGG CRATE ULNAR 2PCS (PINK)

## (undated) DEVICE — PACK MAJOR ABDOMINAL SUPINE

## (undated) DEVICE — SUT MAXON 1 36" GS-24

## (undated) DEVICE — BRUSH COLONOSCOPY CYTOLOGY

## (undated) DEVICE — GLV 8.5 PROTEXIS (WHITE)

## (undated) DEVICE — IRRIGATOR BIO SHIELD

## (undated) DEVICE — DRSG BANDAID 0.75X3"

## (undated) DEVICE — FOLEY TRAY 16FR 5CC LTX UMETER CLOSED

## (undated) DEVICE — TROCAR COVIDIEN VERSASTEP PLUS 11MM STANDARD

## (undated) DEVICE — SUT SOFSILK 2-0 30" V-20

## (undated) DEVICE — GOWN TRIMAX LG

## (undated) DEVICE — TUBING SUCTION 20FT

## (undated) DEVICE — SNARE POLYP HEXAGONAL SM 13X2.4X240

## (undated) DEVICE — DRSG STERISTRIPS 0.5 X 4"

## (undated) DEVICE — SUT POLYSORB 1 60" TIES

## (undated) DEVICE — GELPOINT ADVANCED

## (undated) DEVICE — TUBING STRYKEFLOW II SUCTION / IRRIGATOR

## (undated) DEVICE — CONTAINER FORMALIN 80ML YELLOW

## (undated) DEVICE — BIOPSY FORCEP RADIAL JAW 4 STANDARD WITH NEEDLE

## (undated) DEVICE — SOL IRR POUR NS 0.9% 500ML

## (undated) DEVICE — DRSG TEGADERM 6"X8"

## (undated) DEVICE — DRSG CURITY GAUZE SPONGE 4 X 4" 12-PLY NON-STERILE

## (undated) DEVICE — SALIVA EJECTOR (BLUE)

## (undated) DEVICE — SYR ORISE GEL SNGL PACK

## (undated) DEVICE — GLV 7 PROTEXIS (WHITE)

## (undated) DEVICE — SUT SOFSILK 2-0 18" V-20 (POP-OFF)

## (undated) DEVICE — LIGASURE IMPACT

## (undated) DEVICE — TUBING INSUFFLATION LAP FILTER 10FT

## (undated) DEVICE — DRAPE MAYO STAND 30"

## (undated) DEVICE — SYR LUER LOK 50CC

## (undated) DEVICE — OSTOMY KIT 2-PIECE 4" NS (YELLOW)

## (undated) DEVICE — WARMING BLANKET UPPER ADULT

## (undated) DEVICE — LUBRICATING JELLY HR ONE SHOT 3G

## (undated) DEVICE — PACK COLON BUNDLE

## (undated) DEVICE — SUCTION YANKAUER NO CONTROL VENT

## (undated) DEVICE — TUBING MEDI-VAC W MAXIGRIP CONNECTORS 1/4"X6'

## (undated) DEVICE — TROCAR COVIDIEN BLUNT TIP HASSAN 10MM

## (undated) DEVICE — TUBING SUCTION NONCONDUCTIVE 6MM X 12FT

## (undated) DEVICE — VENODYNE/SCD SLEEVE CALF LARGE

## (undated) DEVICE — CATH IV SAFE BC 20G X 1.16" (PINK)

## (undated) DEVICE — MEDICATION LABELS W MARKER

## (undated) DEVICE — SENSOR O2 FINGER ADULT